# Patient Record
Sex: FEMALE | Race: WHITE | NOT HISPANIC OR LATINO | Employment: OTHER | URBAN - METROPOLITAN AREA
[De-identification: names, ages, dates, MRNs, and addresses within clinical notes are randomized per-mention and may not be internally consistent; named-entity substitution may affect disease eponyms.]

---

## 2018-11-07 ENCOUNTER — APPOINTMENT (OUTPATIENT)
Dept: GENERAL RADIOLOGY | Facility: HOSPITAL | Age: 83
End: 2018-11-07

## 2018-11-07 ENCOUNTER — APPOINTMENT (OUTPATIENT)
Dept: CARDIOLOGY | Facility: HOSPITAL | Age: 83
End: 2018-11-07
Attending: INTERNAL MEDICINE

## 2018-11-07 ENCOUNTER — HOSPITAL ENCOUNTER (INPATIENT)
Facility: HOSPITAL | Age: 83
LOS: 6 days | Discharge: HOME | End: 2018-11-13
Attending: EMERGENCY MEDICINE | Admitting: INTERNAL MEDICINE

## 2018-11-07 DIAGNOSIS — Z74.09 IMPAIRED FUNCTIONAL MOBILITY, BALANCE, GAIT, AND ENDURANCE: ICD-10-CM

## 2018-11-07 DIAGNOSIS — J18.9 COMMUNITY ACQUIRED PNEUMONIA OF RIGHT LOWER LOBE OF LUNG: Primary | ICD-10-CM

## 2018-11-07 DIAGNOSIS — J96.01 ACUTE RESPIRATORY FAILURE WITH HYPOXIA (HCC): ICD-10-CM

## 2018-11-07 DIAGNOSIS — R13.12 OROPHARYNGEAL DYSPHAGIA: ICD-10-CM

## 2018-11-07 DIAGNOSIS — R09.02 HYPOXEMIA: ICD-10-CM

## 2018-11-07 DIAGNOSIS — Z78.9 IMPAIRED MOBILITY AND ADLS: ICD-10-CM

## 2018-11-07 DIAGNOSIS — Z74.09 IMPAIRED MOBILITY AND ADLS: ICD-10-CM

## 2018-11-07 PROBLEM — I95.9 HYPOTENSION: Status: ACTIVE | Noted: 2018-11-07

## 2018-11-07 PROBLEM — R77.8 ELEVATED TROPONIN: Status: ACTIVE | Noted: 2018-11-07

## 2018-11-07 PROBLEM — J69.0 ASPIRATION PNEUMONIA (HCC): Status: ACTIVE | Noted: 2018-11-07
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
## 2018-11-07 PROBLEM — G30.1 LATE ONSET ALZHEIMER'S DISEASE WITHOUT BEHAVIORAL DISTURBANCE (HCC): Status: ACTIVE | Noted: 2018-11-07

## 2018-11-07 PROBLEM — F02.80 LATE ONSET ALZHEIMER'S DISEASE WITHOUT BEHAVIORAL DISTURBANCE (HCC): Status: ACTIVE | Noted: 2018-11-07

## 2018-11-07 PROBLEM — I48.91 ATRIAL FIBRILLATION WITH RAPID VENTRICULAR RESPONSE (HCC): Status: ACTIVE | Noted: 2018-11-07

## 2018-11-07 LAB
ALBUMIN SERPL-MCNC: 4.02 G/DL (ref 3.2–4.8)
ALBUMIN/GLOB SERPL: 1.4 G/DL (ref 1.5–2.5)
ALP SERPL-CCNC: 74 U/L (ref 25–100)
ALT SERPL W P-5'-P-CCNC: 23 U/L (ref 7–40)
ANION GAP SERPL CALCULATED.3IONS-SCNC: 8 MMOL/L (ref 3–11)
APTT PPP: 110.4 SECONDS (ref 55–70)
APTT PPP: 29.9 SECONDS (ref 24–31)
APTT PPP: 93.5 SECONDS (ref 55–70)
ARTERIAL PATENCY WRIST A: ABNORMAL
AST SERPL-CCNC: 34 U/L (ref 0–33)
ATMOSPHERIC PRESS: ABNORMAL MMHG
BASE EXCESS BLDA CALC-SCNC: 0.8 MMOL/L (ref 0–2)
BASOPHILS # BLD AUTO: 0.01 10*3/MM3 (ref 0–0.2)
BASOPHILS NFR BLD AUTO: 0.1 % (ref 0–1)
BDY SITE: ABNORMAL
BH CV ECHO MEAS - AO ROOT AREA (BSA CORRECTED): 1.9
BH CV ECHO MEAS - AO ROOT AREA: 6.2 CM^2
BH CV ECHO MEAS - AO ROOT DIAM: 2.8 CM
BH CV ECHO MEAS - BSA(HAYCOCK): 1.5 M^2
BH CV ECHO MEAS - BSA: 1.5 M^2
BH CV ECHO MEAS - BZI_BMI: 22.5 KILOGRAMS/M^2
BH CV ECHO MEAS - BZI_METRIC_HEIGHT: 152.4 CM
BH CV ECHO MEAS - BZI_METRIC_WEIGHT: 52.2 KG
BH CV ECHO MEAS - EDV(CUBED): 49 ML
BH CV ECHO MEAS - EDV(MOD-SP2): 22 ML
BH CV ECHO MEAS - EDV(MOD-SP4): 32 ML
BH CV ECHO MEAS - EDV(TEICH): 56.6 ML
BH CV ECHO MEAS - EF(CUBED): 68.5 %
BH CV ECHO MEAS - EF(MOD-BP): 63 %
BH CV ECHO MEAS - EF(MOD-SP2): 50 %
BH CV ECHO MEAS - EF(MOD-SP4): 62.5 %
BH CV ECHO MEAS - EF(TEICH): 61 %
BH CV ECHO MEAS - ESV(CUBED): 15.4 ML
BH CV ECHO MEAS - ESV(MOD-SP2): 11 ML
BH CV ECHO MEAS - ESV(MOD-SP4): 12 ML
BH CV ECHO MEAS - ESV(TEICH): 22.1 ML
BH CV ECHO MEAS - FS: 32 %
BH CV ECHO MEAS - IVS/LVPW: 1.1
BH CV ECHO MEAS - IVSD: 1.2 CM
BH CV ECHO MEAS - LA DIMENSION: 2.9 CM
BH CV ECHO MEAS - LA/AO: 1
BH CV ECHO MEAS - LAD MAJOR: 6.3 CM
BH CV ECHO MEAS - LAT PEAK E' VEL: 10.4 CM/SEC
BH CV ECHO MEAS - LATERAL E/E' RATIO: 8.2
BH CV ECHO MEAS - LV DIASTOLIC VOL/BSA (35-75): 21.7 ML/M^2
BH CV ECHO MEAS - LV MASS(C)D: 131.6 GRAMS
BH CV ECHO MEAS - LV MASS(C)DI: 89.2 GRAMS/M^2
BH CV ECHO MEAS - LV SYSTOLIC VOL/BSA (12-30): 8.1 ML/M^2
BH CV ECHO MEAS - LVIDD: 3.7 CM
BH CV ECHO MEAS - LVIDS: 2.3 CM
BH CV ECHO MEAS - LVLD AP2: 5.8 CM
BH CV ECHO MEAS - LVLD AP4: 6.1 CM
BH CV ECHO MEAS - LVLS AP2: 5.6 CM
BH CV ECHO MEAS - LVLS AP4: 5.4 CM
BH CV ECHO MEAS - LVPWD: 1.2 CM
BH CV ECHO MEAS - MED PEAK E' VEL: 9.1 CM/SEC
BH CV ECHO MEAS - MEDIAL E/E' RATIO: 9.3
BH CV ECHO MEAS - MV DEC SLOPE: 700 CM/SEC^2
BH CV ECHO MEAS - MV DEC TIME: 0.18 SEC
BH CV ECHO MEAS - MV E MAX VEL: 84.9 CM/SEC
BH CV ECHO MEAS - PA ACC SLOPE: 954 CM/SEC^2
BH CV ECHO MEAS - PA ACC TIME: 0.07 SEC
BH CV ECHO MEAS - PA PR(ACCEL): 49.3 MMHG
BH CV ECHO MEAS - RVDD: 2.8 CM
BH CV ECHO MEAS - SI(CUBED): 22.8 ML/M^2
BH CV ECHO MEAS - SI(MOD-SP2): 7.5 ML/M^2
BH CV ECHO MEAS - SI(MOD-SP4): 13.6 ML/M^2
BH CV ECHO MEAS - SI(TEICH): 23.4 ML/M^2
BH CV ECHO MEAS - SV(CUBED): 33.6 ML
BH CV ECHO MEAS - SV(MOD-SP2): 11 ML
BH CV ECHO MEAS - SV(MOD-SP4): 20 ML
BH CV ECHO MEAS - SV(TEICH): 34.5 ML
BH CV ECHO MEAS - TR MAX PG: 15 MMHG
BH CV ECHO MEAS - TR MAX VEL: 194 CM/SEC
BH CV ECHO MEASUREMENTS AVERAGE E/E' RATIO: 8.71
BH CV VAS BP RIGHT ARM: NORMAL MMHG
BILIRUB SERPL-MCNC: 0.6 MG/DL (ref 0.3–1.2)
BNP SERPL-MCNC: 807 PG/ML (ref 0–100)
BODY TEMPERATURE: 37 C
BUN BLD-MCNC: 23 MG/DL (ref 9–23)
BUN/CREAT SERPL: 27.4 (ref 7–25)
CALCIUM SPEC-SCNC: 8.2 MG/DL (ref 8.7–10.4)
CHLORIDE SERPL-SCNC: 99 MMOL/L (ref 99–109)
CO2 SERPL-SCNC: 25 MMOL/L (ref 20–31)
COHGB MFR BLD: 0.8 % (ref 0–2)
CREAT BLD-MCNC: 0.84 MG/DL (ref 0.6–1.3)
D-LACTATE SERPL-SCNC: 1.7 MMOL/L (ref 0.5–2)
DEPRECATED RDW RBC AUTO: 48.5 FL (ref 37–54)
EOSINOPHIL # BLD AUTO: 0 10*3/MM3 (ref 0–0.3)
EOSINOPHIL NFR BLD AUTO: 0 % (ref 0–3)
ERYTHROCYTE [DISTWIDTH] IN BLOOD BY AUTOMATED COUNT: 14.8 % (ref 11.3–14.5)
FLUAV SUBTYP SPEC NAA+PROBE: NORMAL
FLUAV SUBTYP SPEC NAA+PROBE: NOT DETECTED
FLUBV RNA ISLT QL NAA+PROBE: NORMAL
FLUBV RNA ISLT QL NAA+PROBE: NOT DETECTED
GFR SERPL CREATININE-BSD FRML MDRD: 64 ML/MIN/1.73
GLOBULIN UR ELPH-MCNC: 2.9 GM/DL
GLUCOSE BLD-MCNC: 114 MG/DL (ref 70–100)
HCO3 BLDA-SCNC: 26.1 MMOL/L (ref 20–26)
HCT VFR BLD AUTO: 36.8 % (ref 34.5–44)
HCT VFR BLD CALC: 34 %
HGB BLD-MCNC: 11.9 G/DL (ref 11.5–15.5)
HGB BLDA-MCNC: 11.1 G/DL (ref 14–18)
HOROWITZ INDEX BLD+IHG-RTO: 80 %
IMM GRANULOCYTES # BLD: 0.03 10*3/MM3 (ref 0–0.03)
IMM GRANULOCYTES NFR BLD: 0.3 % (ref 0–0.6)
INR PPP: 1.08 (ref 0.91–1.09)
INR PPP: 1.09 (ref 0.91–1.09)
LV EF 2D ECHO EST: 70 %
LYMPHOCYTES # BLD AUTO: 1.1 10*3/MM3 (ref 0.6–4.8)
LYMPHOCYTES NFR BLD AUTO: 12.1 % (ref 24–44)
MAXIMAL PREDICTED HEART RATE: 133 BPM
MCH RBC QN AUTO: 28.7 PG (ref 27–31)
MCHC RBC AUTO-ENTMCNC: 32.3 G/DL (ref 32–36)
MCV RBC AUTO: 88.9 FL (ref 80–99)
METHGB BLD QL: 0.9 % (ref 0–1.5)
MODALITY: ABNORMAL
MONOCYTES # BLD AUTO: 1.68 10*3/MM3 (ref 0–1)
MONOCYTES NFR BLD AUTO: 18.5 % (ref 0–12)
NEUTROPHILS # BLD AUTO: 6.28 10*3/MM3 (ref 1.5–8.3)
NEUTROPHILS NFR BLD AUTO: 69.3 % (ref 41–71)
NOTE: ABNORMAL
OXYHGB MFR BLDV: 97.1 % (ref 94–99)
PCO2 BLDA: 43.6 MM HG (ref 35–45)
PCO2 TEMP ADJ BLD: 43.6 MM HG (ref 35–45)
PH BLDA: 7.39 PH UNITS (ref 7.35–7.45)
PH, TEMP CORRECTED: 7.39 PH UNITS
PLATELET # BLD AUTO: 154 10*3/MM3 (ref 150–450)
PMV BLD AUTO: 10.4 FL (ref 6–12)
PO2 BLDA: 120 MM HG (ref 83–108)
PO2 TEMP ADJ BLD: 120 MM HG (ref 83–108)
POTASSIUM BLD-SCNC: 4 MMOL/L (ref 3.5–5.5)
PROCALCITONIN SERPL-MCNC: 2.48 NG/ML
PROT SERPL-MCNC: 6.9 G/DL (ref 5.7–8.2)
PROTHROMBIN TIME: 11.3 SECONDS (ref 9.6–11.5)
PROTHROMBIN TIME: 11.4 SECONDS (ref 9.6–11.5)
RBC # BLD AUTO: 4.14 10*6/MM3 (ref 3.89–5.14)
SODIUM BLD-SCNC: 132 MMOL/L (ref 132–146)
STRESS TARGET HR: 113 BPM
TROPONIN I SERPL-MCNC: 0.2 NG/ML
WBC NRBC COR # BLD: 9.07 10*3/MM3 (ref 3.5–10.8)

## 2018-11-07 PROCEDURE — 83880 ASSAY OF NATRIURETIC PEPTIDE: CPT | Performed by: EMERGENCY MEDICINE

## 2018-11-07 PROCEDURE — 85730 THROMBOPLASTIN TIME PARTIAL: CPT

## 2018-11-07 PROCEDURE — 25010000002 CEFTRIAXONE PER 250 MG: Performed by: EMERGENCY MEDICINE

## 2018-11-07 PROCEDURE — 87502 INFLUENZA DNA AMP PROBE: CPT | Performed by: EMERGENCY MEDICINE

## 2018-11-07 PROCEDURE — 85730 THROMBOPLASTIN TIME PARTIAL: CPT | Performed by: INTERNAL MEDICINE

## 2018-11-07 PROCEDURE — 93306 TTE W/DOPPLER COMPLETE: CPT | Performed by: INTERNAL MEDICINE

## 2018-11-07 PROCEDURE — 94799 UNLISTED PULMONARY SVC/PX: CPT

## 2018-11-07 PROCEDURE — 71045 X-RAY EXAM CHEST 1 VIEW: CPT

## 2018-11-07 PROCEDURE — 36415 COLL VENOUS BLD VENIPUNCTURE: CPT

## 2018-11-07 PROCEDURE — 94760 N-INVAS EAR/PLS OXIMETRY 1: CPT

## 2018-11-07 PROCEDURE — 87040 BLOOD CULTURE FOR BACTERIA: CPT | Performed by: EMERGENCY MEDICINE

## 2018-11-07 PROCEDURE — 85730 THROMBOPLASTIN TIME PARTIAL: CPT | Performed by: EMERGENCY MEDICINE

## 2018-11-07 PROCEDURE — 84484 ASSAY OF TROPONIN QUANT: CPT | Performed by: EMERGENCY MEDICINE

## 2018-11-07 PROCEDURE — 94640 AIRWAY INHALATION TREATMENT: CPT

## 2018-11-07 PROCEDURE — 25010000002 AZITHROMYCIN: Performed by: EMERGENCY MEDICINE

## 2018-11-07 PROCEDURE — 93005 ELECTROCARDIOGRAM TRACING: CPT | Performed by: EMERGENCY MEDICINE

## 2018-11-07 PROCEDURE — 85610 PROTHROMBIN TIME: CPT | Performed by: EMERGENCY MEDICINE

## 2018-11-07 PROCEDURE — 83605 ASSAY OF LACTIC ACID: CPT | Performed by: EMERGENCY MEDICINE

## 2018-11-07 PROCEDURE — 36600 WITHDRAWAL OF ARTERIAL BLOOD: CPT

## 2018-11-07 PROCEDURE — 80053 COMPREHEN METABOLIC PANEL: CPT | Performed by: EMERGENCY MEDICINE

## 2018-11-07 PROCEDURE — 87040 BLOOD CULTURE FOR BACTERIA: CPT | Performed by: INTERNAL MEDICINE

## 2018-11-07 PROCEDURE — 84145 PROCALCITONIN (PCT): CPT | Performed by: EMERGENCY MEDICINE

## 2018-11-07 PROCEDURE — 99223 1ST HOSP IP/OBS HIGH 75: CPT | Performed by: INTERNAL MEDICINE

## 2018-11-07 PROCEDURE — 25010000002 HEPARIN (PORCINE) PER 1000 UNITS: Performed by: INTERNAL MEDICINE

## 2018-11-07 PROCEDURE — 85610 PROTHROMBIN TIME: CPT | Performed by: INTERNAL MEDICINE

## 2018-11-07 PROCEDURE — 99291 CRITICAL CARE FIRST HOUR: CPT | Performed by: INTERNAL MEDICINE

## 2018-11-07 PROCEDURE — 93306 TTE W/DOPPLER COMPLETE: CPT

## 2018-11-07 PROCEDURE — 82805 BLOOD GASES W/O2 SATURATION: CPT

## 2018-11-07 PROCEDURE — 99285 EMERGENCY DEPT VISIT HI MDM: CPT

## 2018-11-07 PROCEDURE — 85025 COMPLETE CBC W/AUTO DIFF WBC: CPT | Performed by: EMERGENCY MEDICINE

## 2018-11-07 PROCEDURE — 25010000002 LORAZEPAM PER 2 MG: Performed by: NURSE PRACTITIONER

## 2018-11-07 RX ORDER — HEPARIN SODIUM 1000 [USP'U]/ML
60 INJECTION, SOLUTION INTRAVENOUS; SUBCUTANEOUS ONCE
Status: COMPLETED | OUTPATIENT
Start: 2018-11-07 | End: 2018-11-07

## 2018-11-07 RX ORDER — MELATONIN
1000 DAILY
Status: DISCONTINUED | OUTPATIENT
Start: 2018-11-08 | End: 2018-11-13 | Stop reason: HOSPADM

## 2018-11-07 RX ORDER — LEVOTHYROXINE SODIUM 88 UG/1
88 TABLET ORAL SEE ADMIN INSTRUCTIONS
COMMUNITY

## 2018-11-07 RX ORDER — DIAZEPAM 2 MG/1
2 TABLET ORAL 3 TIMES DAILY PRN
Status: ON HOLD | COMMUNITY
End: 2018-11-13 | Stop reason: SDUPTHER

## 2018-11-07 RX ORDER — ASPIRIN 81 MG/1
81 TABLET ORAL DAILY
COMMUNITY

## 2018-11-07 RX ORDER — LEVOTHYROXINE SODIUM 88 UG/1
88 TABLET ORAL
Status: DISCONTINUED | OUTPATIENT
Start: 2018-11-08 | End: 2018-11-13 | Stop reason: HOSPADM

## 2018-11-07 RX ORDER — ONDANSETRON 2 MG/ML
4 INJECTION INTRAMUSCULAR; INTRAVENOUS EVERY 6 HOURS PRN
Status: DISCONTINUED | OUTPATIENT
Start: 2018-11-07 | End: 2018-11-13 | Stop reason: HOSPADM

## 2018-11-07 RX ORDER — ALBUTEROL SULFATE 2.5 MG/3ML
2.5 SOLUTION RESPIRATORY (INHALATION) EVERY 4 HOURS PRN
Status: DISCONTINUED | OUTPATIENT
Start: 2018-11-07 | End: 2018-11-13 | Stop reason: HOSPADM

## 2018-11-07 RX ORDER — CEFTRIAXONE SODIUM 1 G/50ML
1 INJECTION, SOLUTION INTRAVENOUS EVERY 24 HOURS
Status: DISCONTINUED | OUTPATIENT
Start: 2018-11-08 | End: 2018-11-08 | Stop reason: ALTCHOICE

## 2018-11-07 RX ORDER — MELATONIN
1000 DAILY
COMMUNITY

## 2018-11-07 RX ORDER — SODIUM CHLORIDE 0.9 % (FLUSH) 0.9 %
3-10 SYRINGE (ML) INJECTION AS NEEDED
Status: DISCONTINUED | OUTPATIENT
Start: 2018-11-07 | End: 2018-11-13 | Stop reason: HOSPADM

## 2018-11-07 RX ORDER — DONEPEZIL HYDROCHLORIDE 10 MG/1
10 TABLET, FILM COATED ORAL NIGHTLY
Status: DISCONTINUED | OUTPATIENT
Start: 2018-11-07 | End: 2018-11-13 | Stop reason: HOSPADM

## 2018-11-07 RX ORDER — LORAZEPAM 2 MG/ML
0.25 INJECTION INTRAMUSCULAR ONCE
Status: COMPLETED | OUTPATIENT
Start: 2018-11-07 | End: 2018-11-07

## 2018-11-07 RX ORDER — ACETAMINOPHEN 325 MG/1
650 TABLET ORAL EVERY 4 HOURS PRN
Status: DISCONTINUED | OUTPATIENT
Start: 2018-11-07 | End: 2018-11-13 | Stop reason: HOSPADM

## 2018-11-07 RX ORDER — GLYCOPYRROLATE 0.2 MG/ML
0.2 INJECTION INTRAMUSCULAR; INTRAVENOUS EVERY 4 HOURS PRN
Status: DISCONTINUED | OUTPATIENT
Start: 2018-11-07 | End: 2018-11-13 | Stop reason: HOSPADM

## 2018-11-07 RX ORDER — ATORVASTATIN CALCIUM 10 MG/1
10 TABLET, FILM COATED ORAL NIGHTLY
Status: DISCONTINUED | OUTPATIENT
Start: 2018-11-07 | End: 2018-11-13 | Stop reason: HOSPADM

## 2018-11-07 RX ORDER — ALENDRONATE SODIUM 70 MG/1
70 TABLET ORAL
COMMUNITY

## 2018-11-07 RX ORDER — DONEPEZIL HYDROCHLORIDE 10 MG/1
10 TABLET, FILM COATED ORAL NIGHTLY
COMMUNITY

## 2018-11-07 RX ORDER — MULTIPLE VITAMINS W/ MINERALS TAB 9MG-400MCG
1 TAB ORAL DAILY
Status: DISCONTINUED | OUTPATIENT
Start: 2018-11-08 | End: 2018-11-13 | Stop reason: HOSPADM

## 2018-11-07 RX ORDER — HEPARIN SODIUM 1000 [USP'U]/ML
60 INJECTION, SOLUTION INTRAVENOUS; SUBCUTANEOUS AS NEEDED
Status: DISCONTINUED | OUTPATIENT
Start: 2018-11-07 | End: 2018-11-07

## 2018-11-07 RX ORDER — ATORVASTATIN CALCIUM 10 MG/1
10 TABLET, FILM COATED ORAL NIGHTLY
COMMUNITY

## 2018-11-07 RX ORDER — DOCUSATE SODIUM 100 MG/1
100 CAPSULE, LIQUID FILLED ORAL 2 TIMES DAILY PRN
Status: DISCONTINUED | OUTPATIENT
Start: 2018-11-07 | End: 2018-11-13 | Stop reason: HOSPADM

## 2018-11-07 RX ORDER — SODIUM CHLORIDE 0.9 % (FLUSH) 0.9 %
3 SYRINGE (ML) INJECTION EVERY 12 HOURS SCHEDULED
Status: DISCONTINUED | OUTPATIENT
Start: 2018-11-07 | End: 2018-11-13 | Stop reason: HOSPADM

## 2018-11-07 RX ORDER — CEFTRIAXONE SODIUM 1 G/50ML
1 INJECTION, SOLUTION INTRAVENOUS ONCE
Status: COMPLETED | OUTPATIENT
Start: 2018-11-07 | End: 2018-11-07

## 2018-11-07 RX ORDER — DIAZEPAM 2 MG/1
2 TABLET ORAL 3 TIMES DAILY PRN
Status: DISCONTINUED | OUTPATIENT
Start: 2018-11-07 | End: 2018-11-13 | Stop reason: HOSPADM

## 2018-11-07 RX ORDER — HEPARIN SODIUM 1000 [USP'U]/ML
30 INJECTION, SOLUTION INTRAVENOUS; SUBCUTANEOUS AS NEEDED
Status: DISCONTINUED | OUTPATIENT
Start: 2018-11-07 | End: 2018-11-07

## 2018-11-07 RX ORDER — ASPIRIN 81 MG/1
81 TABLET ORAL DAILY
Status: DISCONTINUED | OUTPATIENT
Start: 2018-11-08 | End: 2018-11-13 | Stop reason: HOSPADM

## 2018-11-07 RX ADMIN — ALBUTEROL SULFATE 2.5 MG: 2.5 SOLUTION RESPIRATORY (INHALATION) at 19:09

## 2018-11-07 RX ADMIN — LORAZEPAM 0.25 MG: 2 INJECTION INTRAMUSCULAR; INTRAVENOUS at 19:44

## 2018-11-07 RX ADMIN — IPRATROPIUM BROMIDE 0.5 MG: 0.5 SOLUTION RESPIRATORY (INHALATION) at 12:43

## 2018-11-07 RX ADMIN — HEPARIN SODIUM 12 UNITS/KG/HR: 10000 INJECTION, SOLUTION INTRAVENOUS at 16:58

## 2018-11-07 RX ADMIN — HEPARIN SODIUM 3130 UNITS: 1000 INJECTION, SOLUTION INTRAVENOUS; SUBCUTANEOUS at 17:01

## 2018-11-07 RX ADMIN — AZITHROMYCIN MONOHYDRATE 500 MG: 500 INJECTION, POWDER, LYOPHILIZED, FOR SOLUTION INTRAVENOUS at 13:27

## 2018-11-07 RX ADMIN — CEFTRIAXONE SODIUM 1 G: 1 INJECTION, SOLUTION INTRAVENOUS at 12:40

## 2018-11-07 NOTE — ED PROVIDER NOTES
Subjective   Katharine Mendoza is a 87 y.o.female who presents to the ED status post fall. The patient experienced a fall in her nursing home today. She states she was sitting in her bed when she lost her balance after standing up. She denies hitting her head or losing consciousness during the fall. She denies any pain from the fall. The nursing home decided she needed to be evaluated after they discovered she was pale, confused, and had a fast heart rate. When they discovered her PCP was not available, they sent her here to be evaluated. She has not taken any medication since the fall this morning. She also complains of a cough, but denies any shortness of breath, chest pain, vomiting, or diarrhea. Additionally, she denies any history of COPD. There are no other complaints at this time.         History provided by:  Patient and relative  Fall   Mechanism of injury: fall    Injury location: no injuries.  Incident location:  shelter  Time since incident: just PTA.  Arrived directly from scene: yes    Fall:     Fall occurred:  Standing    Impact surface:  Hard floor    Point of impact:  Unable to specify    Entrapped after fall: no    Suspicion of alcohol use: no    Suspicion of drug use: no    Prior to arrival data:     Patient ambulatory at scene: yes      Blood loss:  None    Responsiveness at scene:  Alert    Orientation at scene:  Person, place, situation and time    Loss of consciousness: no      Amnesic to event: no    Associated symptoms: no abdominal pain, no back pain, no chest pain, no headaches, no loss of consciousness, no neck pain and no vomiting    Risk factors: no COPD        Review of Systems   Respiratory: Positive for cough.    Cardiovascular: Positive for palpitations (fast heart rate). Negative for chest pain.   Gastrointestinal: Negative for abdominal pain, diarrhea and vomiting.   Musculoskeletal: Negative for back pain and neck pain.   Skin: Positive for pallor.   Neurological: Negative  for loss of consciousness and headaches.   Psychiatric/Behavioral: Positive for confusion.   All other systems reviewed and are negative.      Past Medical History:   Diagnosis Date   • Anxiety    • Dementia    • Disease of thyroid gland    • Hyperlipidemia    • Osteoporosis        No Known Allergies    History reviewed. No pertinent surgical history.    History reviewed. No pertinent family history.    Social History     Social History   • Marital status:      Social History Main Topics   • Smoking status: Never Smoker   • Smokeless tobacco: Never Used   • Alcohol use No   • Drug use: No     Other Topics Concern   • Not on file     Social History Narrative    Lives in assisted living.           Objective   Physical Exam   Constitutional: She is oriented to person, place, and time. She appears well-developed and well-nourished. No distress.   HENT:   Head: Normocephalic and atraumatic.   Nose: Nose normal.   Eyes: Conjunctivae are normal. No scleral icterus.   Neck: Normal range of motion. Neck supple.   Cardiovascular: Normal heart sounds.  A regularly irregular rhythm present. Tachycardia present.    No murmur heard.  Pulmonary/Chest: Effort normal. No respiratory distress. She has wheezes.   Mild left lung wheezing.    Abdominal: Soft. Bowel sounds are normal. There is no tenderness.   Musculoskeletal: Normal range of motion. She exhibits no edema.   Neurological: She is alert and oriented to person, place, and time.   Skin: Skin is warm and dry.   Psychiatric: She has a normal mood and affect. Her behavior is normal.   Nursing note and vitals reviewed.      Procedures         ED Course     Hypoxemia improved with supplemental oxygen.  EKG AF.  CXR shows RLL infiltrate.  IV abx given.  Patient stable on serial rechecks.  Discussed exam findings, test results so far and concerns in detail at the bedside.  Discussed need for admission for further evaluation and treatment.                  MDM  Number  of Diagnoses or Management Options  Community acquired pneumonia of right lower lobe of lung (CMS/HCC):   Hypoxemia:      Amount and/or Complexity of Data Reviewed  Clinical lab tests: ordered and reviewed  Tests in the radiology section of CPT®: ordered and reviewed  Discuss the patient with other providers: yes  Independent visualization of images, tracings, or specimens: yes        Final diagnoses:   Community acquired pneumonia of right lower lobe of lung (CMS/HCC)   Hypoxemia       Documentation assistance provided by hue Menjivar.  Information recorded by the hue was done at my direction and has been verified and validated by me.     Eloy Menjivar  11/07/18 1243       Eloy Menjivar  11/07/18 1341       Damion Johns MD  11/07/18 3257

## 2018-11-07 NOTE — H&P
[facility] Medicine Services  HISTORY AND PHYSICAL    Patient Name: Katharine Mendoza  : 1931  MRN: [id number]  Primary Care Physician: Samia Carrington MD  Date of admission: 2018      Subjective   Subjective     Chief Complaint:  Fall at nursing home, cough, weakness    HPI:  Katharine Mendoza is a 87 y.o. female  who lives in assisted living facility per family.  Family was provided by patient who is a poor historian with dementia and by her son who is at the bedside.  Patient presented with a fall at her assisted living today, she had no loss of consciousness, no focal neurologic reported symptoms, no pain from her fall, she denies injury, she denies head trauma.  Patient's son felt she was pale, stable chronic confusion, reportedly she was found to have tachycardia and cough today prior to being sent to the hospital.  She currently denies any complaints including shortness of breath or palpitation.  She was found to have pneumonia and hypoxic in atrial fibrillation in the emergency department and is being admitted for further medical management.    Review of Systems   Constitutional: Negative for fever.   HENT: Negative.    Eyes: Negative for blurred vision and visual disturbance.   Respiratory: Positive for cough and shortness of breath.    Cardiovascular: Negative for chest pain and palpitations.   Gastrointestinal: Negative.    Endocrine: Negative for cold intolerance and heat intolerance.   Genitourinary: Negative.    Musculoskeletal: Positive for falls. Negative for muscle cramps.   Skin: Negative for rash.   Neurological: Positive for weakness.   Psychiatric/Behavioral: Negative.       Please ignore below review of systems due to redundant computer issue  Review of Systems   Constitution: Positive for weakness. Negative for fever.   HENT: Negative.    Eyes: Negative for blurred vision and visual disturbance.   Cardiovascular: Negative for chest pain and  palpitations.   Respiratory: Positive for cough and shortness of breath.    Endocrine: Negative for cold intolerance and heat intolerance.   Skin: Negative for rash.   Musculoskeletal: Positive for falls. Negative for muscle cramps.   Gastrointestinal: Negative.    Genitourinary: Negative.    Psychiatric/Behavioral: Negative.          Personal History     Past Medical History:   Diagnosis Date   • Anxiety    • Dementia    • Disease of thyroid gland    • Hyperlipidemia    • Osteoporosis        History reviewed. No pertinent surgical history.    Family History: family history is not on file.     Social History:    Social History     Social History Narrative    Lives in assisted living.         Medications:  Available home medication information reviewed     No Known Allergies    Objective   Objective     Vital Signs:   Temp:  [99.3 °F (37.4 °C)] 99.3 °F (37.4 °C)  Heart Rate:  [] 93  Resp:  [18] 18  BP: ()/(48-65) 101/48        Physical Exam   Constitutional: No acute distress, awake, alert, elderly  Eyes: PERRLA, sclerae anicteric, no conjunctival injection  HENT: NCAT, mucous membranes moist  Neck: Supple, no thyromegaly, no lymphadenopathy, trachea midline, no masses  Respiratory: Clear to auscultation bilaterally, mild tachypnea, current respiratory rate is approximately 20, breathing is mildly labored on nasal cannula oxygen.  Cough is present.  Cardiovascular: RRR, no murmurs, rubs, or gallops, palpable pedal pulses bilaterally, trace bilateral lower extremity edema  Gastrointestinal: Positive bowel sounds, soft, nontender, nondistended  Musculoskeletal: Elderly with normal musculature for age, some muscle wasting noted, no clubbing or cyanosis to extremities  Psychiatric: Appropriate affect, cooperative, conversational and pleasant  Neurologic: Patient is oriented to person and place but not time.  She has poor short-term memory.  No slurred speech or facial droop.    Skin: No rashes or  jaundice, skin is warm      Results Reviewed:  I have personally reviewed current lab, radiology, and data and agree.      Results from last 7 days  Lab Units 11/07/18  1232   WBC 10*3/mm3 9.07   HEMOGLOBIN g/dL 11.9   HEMATOCRIT % 36.8   PLATELETS 10*3/mm3 154   INR  1.08       Results from last 7 days  Lab Units 11/07/18  1232   SODIUM mmol/L 132   POTASSIUM mmol/L 4.0   CHLORIDE mmol/L 99   CO2 mmol/L 25.0   BUN mg/dL 23   CREATININE mg/dL 0.84   GLUCOSE mg/dL 114*   CALCIUM mg/dL 8.2*   ALT (SGPT) U/L 23   AST (SGOT) U/L 34*   TROPONIN I ng/mL 0.203*     Estimated Creatinine Clearance: 38.9 mL/min (by C-G formula based on SCr of 0.84 mg/dL).  Brief Urine Lab Results     None        BNP   Date Value Ref Range Status   11/07/2018 807.0 (H) 0.0 - 100.0 pg/mL Final     Comment:     Results may be falsely decreased if patient taking Biotin.     Imaging Results (last 24 hours)     Procedure Component Value Units Date/Time    XR Chest 1 View [[id number]] Collected:  11/07/18 1408     Updated:  11/07/18 1431    Narrative:       EXAMINATION: XR CHEST 1 VW- 11/07/2018     INDICATION: cough, hypoxemia, L lung wheezes     TECHNIQUE:  Single view frontal chest.     COMPARISONS: 08/17/2010     FINDINGS:  Calcified granulomata are seen in the left lung. There is  opacity in the periphery of the right lower lobe. No sizable effusion.  No pneumothorax. Atherosclerosis and tortuosity of the aorta. Prominent  cardiopericardial silhouette.       Impression:       Right lower lobe airspace disease which could represent  pneumonia.     D:  11/07/2018  E:  11/07/2018     This report was finalized on 11/7/2018 2:28 PM by Darrel Ribeiro.                Assessment/Plan   Assessment / Plan     Active Hospital Problems    Diagnosis   • **Possible Aspiration pneumonia (CMS/HCC), right lower lobe   • Accident due to mechanical fall without injury   • Hypotension   • New onset Atrial fibrillation with rapid ventricular response (CMS/HCC)   •  Elevated troponin   • Acute respiratory failure with hypoxia (CMS/HCC)   • Late onset Alzheimer's disease without behavioral disturbance   • Community acquired pneumonia of right lower lobe of lung (CMS/HCC)         Assessment & Plan:    87-year-old female who presents to the hospital after a fall at her nursing home and was found to have rapid atrial fibrillation, right lower lobe pneumonia, hypotension, elevated pro-calcitonin, elevated troponin, hypoxia and is being admitted for further medical management.    Possible aspiration pneumonia, right lower lobe:  Patient responding well to antibiotics with azithromycin and ceftriaxone.  Plan to continue.  Speech therapy has been asked to evaluate as well.  Monitor for signs of clinical worsening.  Plan for supportive care and symptom treatment.    Mechanical fall without injury:  Patient reports falling from a sitting position at assisted living.  She denies any pain or injury.  She denies any head trauma.    Hypotension: Likely due to pneumonia.  Blood pressure has improved since earlier pressure in the 90s.  Plan to monitor for now as patient does not appear hypovolemic and has elevated BNP.  Plan for small boluses if needed if further low blood pressures occur.    New-onset atrial fibrillation with rapid ventricular response:   Rapid rate has now normalized and patient is ranging from 70s to 90s.  With low blood pressure plan to hold off on beta blocker for now.  Plan to start heparin drip for atrial fibrillation and elevated troponin and have cardiology evaluate.  Patient will need discussion regarding risks versus benefits of long-term anticoagulation.  She may be a good candidate for low-dose Eliquis.    Elevated troponin: Likely due to supply and demand mismatch as only minimally elevated.  Plan to trend troponin.  Currently on aspirin and heparin.    Acute hypoxic respiratory failure:  Patient with significant hypoxia and saturation in the low 80s at  admission.  She has normalized with nasal cannula oxygen.  Continue oxygen and titrate as needed.    Dementia: Continue home medications.  Currently patient is not showing agitation but monitor.  Scheduled melatonin to help with sleep.  If agitation occurs I discussed with son who is okay with low dose Seroquel as needed.  I discussed that this is off label use and I discussed black box warnings.    Chronic anxiety and chronic benzodiazepine use: Patient chronically takes Valium.  She would likely help withdrawal if held.  Plan to continue for now.              DVT prophylaxis: Heparin drip    CODE STATUS:    Code Status and Medical Interventions:   Ordered at: 11/07/18 1622     Code Status:    CPR     Medical Interventions (Level of Support Prior to Arrest):    Full     Comments:    Discussed with patient's son who wanted full code currently but plans to review her living will and bring it to the hospital       Admission Status:  I believe this patient meets inpatient status and will require hospitalization for greater than 2 days due to pneumonia and atrial fibrillation.      Electronically signed by Nayan Alvarado MD, 11/07/18, 4:24 PM.

## 2018-11-07 NOTE — PLAN OF CARE
Problem: Patient Care Overview  Goal: Plan of Care Review  Outcome: Ongoing (interventions implemented as appropriate)   11/07/18 5999   Coping/Psychosocial   Plan of Care Reviewed With patient;family   Plan of Care Review   Progress no change   OTHER   Outcome Summary pt admitted with asp. pna, falls, currently on 5L, no home oxygen.        Problem: Fall Risk (Adult)  Goal: Identify Related Risk Factors and Signs and Symptoms  Outcome: Outcome(s) achieved Date Met: 11/07/18    Goal: Absence of Fall  Outcome: Ongoing (interventions implemented as appropriate)      Problem: Skin Injury Risk (Adult)  Goal: Identify Related Risk Factors and Signs and Symptoms  Outcome: Outcome(s) achieved Date Met: 11/07/18    Goal: Skin Health and Integrity  Outcome: Ongoing (interventions implemented as appropriate)      Problem: Pneumonia (Adult)  Goal: Signs and Symptoms of Listed Potential Problems Will be Absent, Minimized or Managed (Pneumonia)  Outcome: Ongoing (interventions implemented as appropriate)

## 2018-11-08 ENCOUNTER — APPOINTMENT (OUTPATIENT)
Dept: GENERAL RADIOLOGY | Facility: HOSPITAL | Age: 83
End: 2018-11-08

## 2018-11-08 LAB
ANION GAP SERPL CALCULATED.3IONS-SCNC: 8 MMOL/L (ref 3–11)
APTT PPP: 40.5 SECONDS (ref 55–70)
ARTICHOKE IGE QN: 39 MG/DL (ref 0–130)
BUN BLD-MCNC: 24 MG/DL (ref 9–23)
BUN/CREAT SERPL: 30.8 (ref 7–25)
CALCIUM SPEC-SCNC: 7.6 MG/DL (ref 8.7–10.4)
CHLORIDE SERPL-SCNC: 103 MMOL/L (ref 99–109)
CHOLEST SERPL-MCNC: 83 MG/DL (ref 0–200)
CO2 SERPL-SCNC: 25 MMOL/L (ref 20–31)
CREAT BLD-MCNC: 0.78 MG/DL (ref 0.6–1.3)
DEPRECATED RDW RBC AUTO: 48.6 FL (ref 37–54)
ERYTHROCYTE [DISTWIDTH] IN BLOOD BY AUTOMATED COUNT: 14.9 % (ref 11.3–14.5)
GFR SERPL CREATININE-BSD FRML MDRD: 70 ML/MIN/1.73
GLUCOSE BLD-MCNC: 97 MG/DL (ref 70–100)
HBA1C MFR BLD: 5.8 % (ref 4.8–5.6)
HCT VFR BLD AUTO: 33.1 % (ref 34.5–44)
HDLC SERPL-MCNC: 36 MG/DL (ref 40–60)
HGB BLD-MCNC: 10.6 G/DL (ref 11.5–15.5)
INR PPP: 1.09 (ref 0.91–1.09)
MAGNESIUM SERPL-MCNC: 2 MG/DL (ref 1.3–2.7)
MCH RBC QN AUTO: 28.6 PG (ref 27–31)
MCHC RBC AUTO-ENTMCNC: 32 G/DL (ref 32–36)
MCV RBC AUTO: 89.5 FL (ref 80–99)
PLATELET # BLD AUTO: 145 10*3/MM3 (ref 150–450)
PMV BLD AUTO: 10.9 FL (ref 6–12)
POTASSIUM BLD-SCNC: 3.8 MMOL/L (ref 3.5–5.5)
PROTHROMBIN TIME: 11.4 SECONDS (ref 9.6–11.5)
RBC # BLD AUTO: 3.7 10*6/MM3 (ref 3.89–5.14)
SODIUM BLD-SCNC: 136 MMOL/L (ref 132–146)
TRIGL SERPL-MCNC: 47 MG/DL (ref 0–150)
WBC NRBC COR # BLD: 6.14 10*3/MM3 (ref 3.5–10.8)

## 2018-11-08 PROCEDURE — 83036 HEMOGLOBIN GLYCOSYLATED A1C: CPT | Performed by: INTERNAL MEDICINE

## 2018-11-08 PROCEDURE — 25010000002 AZITHROMYCIN PER 500 MG: Performed by: INTERNAL MEDICINE

## 2018-11-08 PROCEDURE — 80061 LIPID PANEL: CPT | Performed by: INTERNAL MEDICINE

## 2018-11-08 PROCEDURE — 94799 UNLISTED PULMONARY SVC/PX: CPT

## 2018-11-08 PROCEDURE — 83735 ASSAY OF MAGNESIUM: CPT | Performed by: INTERNAL MEDICINE

## 2018-11-08 PROCEDURE — 25010000002 HEPARIN (PORCINE) PER 1000 UNITS: Performed by: HOSPITALIST

## 2018-11-08 PROCEDURE — 80048 BASIC METABOLIC PNL TOTAL CA: CPT | Performed by: INTERNAL MEDICINE

## 2018-11-08 PROCEDURE — 74230 X-RAY XM SWLNG FUNCJ C+: CPT

## 2018-11-08 PROCEDURE — 92611 MOTION FLUOROSCOPY/SWALLOW: CPT

## 2018-11-08 PROCEDURE — 92610 EVALUATE SWALLOWING FUNCTION: CPT

## 2018-11-08 PROCEDURE — 85610 PROTHROMBIN TIME: CPT | Performed by: INTERNAL MEDICINE

## 2018-11-08 PROCEDURE — 87205 SMEAR GRAM STAIN: CPT | Performed by: INTERNAL MEDICINE

## 2018-11-08 PROCEDURE — 85027 COMPLETE CBC AUTOMATED: CPT | Performed by: INTERNAL MEDICINE

## 2018-11-08 PROCEDURE — 25010000002 PIPERACILLIN SOD-TAZOBACTAM PER 1 G: Performed by: HOSPITALIST

## 2018-11-08 PROCEDURE — 85730 THROMBOPLASTIN TIME PARTIAL: CPT

## 2018-11-08 PROCEDURE — 99233 SBSQ HOSP IP/OBS HIGH 50: CPT | Performed by: HOSPITALIST

## 2018-11-08 PROCEDURE — 93010 ELECTROCARDIOGRAM REPORT: CPT | Performed by: INTERNAL MEDICINE

## 2018-11-08 PROCEDURE — 99222 1ST HOSP IP/OBS MODERATE 55: CPT | Performed by: INTERNAL MEDICINE

## 2018-11-08 PROCEDURE — 87070 CULTURE OTHR SPECIMN AEROBIC: CPT | Performed by: INTERNAL MEDICINE

## 2018-11-08 PROCEDURE — 93005 ELECTROCARDIOGRAM TRACING: CPT | Performed by: PHYSICIAN ASSISTANT

## 2018-11-08 RX ORDER — HEPARIN SODIUM 5000 [USP'U]/ML
5000 INJECTION, SOLUTION INTRAVENOUS; SUBCUTANEOUS EVERY 8 HOURS SCHEDULED
Status: DISCONTINUED | OUTPATIENT
Start: 2018-11-08 | End: 2018-11-13 | Stop reason: HOSPADM

## 2018-11-08 RX ORDER — SODIUM CHLORIDE 9 MG/ML
25 INJECTION, SOLUTION INTRAVENOUS CONTINUOUS
Status: DISCONTINUED | OUTPATIENT
Start: 2018-11-08 | End: 2018-11-12

## 2018-11-08 RX ADMIN — BARIUM SULFATE 20 ML: 400 PASTE ORAL at 14:10

## 2018-11-08 RX ADMIN — SODIUM CHLORIDE 50 ML/HR: 9 INJECTION, SOLUTION INTRAVENOUS at 11:00

## 2018-11-08 RX ADMIN — BARIUM SULFATE 100 ML: 0.81 POWDER, FOR SUSPENSION ORAL at 14:10

## 2018-11-08 RX ADMIN — ATORVASTATIN CALCIUM 10 MG: 10 TABLET, FILM COATED ORAL at 21:23

## 2018-11-08 RX ADMIN — Medication 2.5 MG: at 21:23

## 2018-11-08 RX ADMIN — HEPARIN SODIUM 5000 UNITS: 5000 INJECTION, SOLUTION INTRAVENOUS; SUBCUTANEOUS at 15:33

## 2018-11-08 RX ADMIN — DONEPEZIL HYDROCHLORIDE 10 MG: 10 TABLET, FILM COATED ORAL at 21:23

## 2018-11-08 RX ADMIN — Medication 3 ML: at 21:23

## 2018-11-08 RX ADMIN — AZITHROMYCIN MONOHYDRATE 250 MG: 500 INJECTION, POWDER, LYOPHILIZED, FOR SOLUTION INTRAVENOUS at 15:33

## 2018-11-08 RX ADMIN — HEPARIN SODIUM 5000 UNITS: 5000 INJECTION, SOLUTION INTRAVENOUS; SUBCUTANEOUS at 21:23

## 2018-11-08 RX ADMIN — TAZOBACTAM SODIUM AND PIPERACILLIN SODIUM 3.38 G: 375; 3 INJECTION, SOLUTION INTRAVENOUS at 17:04

## 2018-11-08 RX ADMIN — BARIUM SULFATE 50 ML: 400 SUSPENSION ORAL at 14:11

## 2018-11-08 RX ADMIN — TAZOBACTAM SODIUM AND PIPERACILLIN SODIUM 3.38 G: 375; 3 INJECTION, SOLUTION INTRAVENOUS at 12:11

## 2018-11-08 RX ADMIN — BARIUM SULFATE 10 ML: 400 SUSPENSION ORAL at 14:11

## 2018-11-08 NOTE — PLAN OF CARE
Problem: Patient Care Overview  Goal: Plan of Care Review  Outcome: Ongoing (interventions implemented as appropriate)   11/08/18 1219   Coping/Psychosocial   Plan of Care Reviewed With patient;family   Plan of Care Review   Progress declining   OTHER   Outcome Summary PATIENT ON NON-REBREATHER, CONFUSED AND UNABLE TO CLEAR SECRETIONS       Problem: Fall Risk (Adult)  Goal: Absence of Fall  Outcome: Ongoing (interventions implemented as appropriate)      Problem: Pneumonia (Adult)  Goal: Signs and Symptoms of Listed Potential Problems Will be Absent, Minimized or Managed (Pneumonia)  Outcome: Ongoing (interventions implemented as appropriate)

## 2018-11-08 NOTE — DISCHARGE INSTR - DIET
MBS/VFSS/FEES    Reason for Referral  Patient was referred for a MBS to assess the efficiency of his/her swallow function, rule out aspiration and make recommendations regarding safe dietary consistencies, effective compensatory strategies, and safe eating environment.                   Recommendations/Treatment  Oral Prep Phase: impaired oral phase of swallowing  Oral Transit Phase: impaired  Oral Residue: impaired              SLP Swallowing Diagnosis: mod-severe, oral dysfunction, pharyngeal dysfunction  Functional Impact: risk of aspiration/pneumonia, risk of malnutrition, risk of dehydration  Rehab Potential/Prognosis, Swallowing: adequate, monitor progress closely  Swallow Criteria for Skilled Therapeutic Interventions Met: demonstrates skilled criteria    Therapy Frequency (Swallow): 5 days per week  Predicted Duration Therapy Intervention (Days): until discharge  SLP Diet Recommendation: puree, honey thick liquids  Recommended Diagnostics: reassess via VFSS (MBS), other (see comments) (when indicated by SLP)  Recommended Precautions and Strategies: upright posture during/after eating, small bites of food and sips of liquid, no straw, multiple swallows per bite of food, multiple swallows per sip of liquid, other (see comments) (general aspiration precautions; oral care BID/PRN)  SLP Rec. for Method of Medication Administration: meds crushed, with pudding or applesauce  Monitor for Signs of Aspiration: yes, notify SLP if any concerns  Anticipated Dischage Disposition: unknown, anticipate therapy at next level of care      Oral Preparation/ Oral Phase  Oral Prep Phase: impaired oral phase of swallowing  Oral Holding: all consistencies tested, secondary to impaired cognitive status  Prolonged Manipulation: all consistencies tested, secondary to reduced lingual strength, secondary to reduced lingual range of motion, secondary to impaired cognitive status  Oral Preparatory Phase, Comment: Prolonged manipulation ?  w/ solids. Oral holding t/o eval.          Pharyngeal Phase  Initiation of Pharyngeal Swallow: bolus in pyriform sinuses  Pharyngeal Phase: impaired pharyngeal phase of swallowing  Penetration Before the Swallow: thin liquids, secondary to reduced back of tongue control, secondary to delayed swallow initiation or mistiming  Penetration During the Swallow: thin liquids, nectar-thick liquids, secondary to delayed swallow initiation or mistiming, secondary to reduced laryngeal elevation, secondary to reduced vestibular closure, other (see comments) (penetration didn't clear and deepened to TVFs)  Aspiration After the Swallow: thin liquids, nectar-thick liquids, secondary to residue, in laryngeal vestibule  Response to Penetration: no response, deep  Response to Aspiration: no response, silent aspiration, could not clear subglottic material  Pharyngeal Residue: all consistencies tested, base of tongue, valleculae, pyriform sinuses, posterior pharyngeal wall, laryngeal vestibule, diffuse within pharynx, secondary to reduced base of tongue retraction, secondary to reduced posterior pharyngeal wall stripping, secondary to reduced laryngeal elevation, other (see comments) (> in valleculae and incr'd w/ solids)  Response to Residue: unable to clear residue, other (see comments) (residue reduced w/ subsequent swallows)  Attempted Compensatory Maneuvers: bolus size, bolus presentation style, alternative liquids/solids, throat clear after swallow, other (see comments) (pt w/ difficulty following commands)  Response to Attempted Compensatory Maneuvers: prevented aspiration, did not reduce residue  Pharyngeal Phase, Comment: Moderate-severe oropharyngeal dysphagia. Penetration occurred before/during the swallow w/ thins and during the swallow w/ nectar-thick liquids 2' mistiming/delay and reduced vestibular closure. Penetration did not clear and deepened w/ continued trials. Pt eventually aspirated thin and nectar-thick  vestibular residue t/o exam. Aspiration was silent. No penetration/aspiration observed w/ honey-thick liquids or pudding. Moderate diffuse pharyngeal residue present w/ all consistencies (> in valleculae and incr'd w/ solids) 2' reduced base of tongue retraction, decr'd pharyngeal stripping wave, and reduced elevation. Residue reduced w/ subsequent swallows, but unable to fully clear.              Cervical Esophageal Phase              Prognosis          MBS/VFSS/FEES 11/12/18 **most recent swallowing recommendations below:    Reason for Referral  Patient was referred for a MBS to assess the efficiency of his/her swallow function, rule out aspiration and make recommendations regarding safe dietary consistencies, effective compensatory strategies, and safe eating environment.        Referring Physician: MD Shelly          Recommendations/Treatment  Oral Prep Phase: impaired oral phase of swallowing  Oral Transit Phase: impaired  Oral Residue: impaired  VFSS Summary: SLP MBS evaluation completed. Pt presents w/ mild-moderate oropharyngeal dysphagia. Prolonged manipulation/mastication and increased A-P transit time w/ regular solid. Mild diffuse oral residue w/ all consistencies tested that typically cleared w/ spontaneous subsequent swallows. Prespill + delay to the level of the pyriform sinuses resulted in aspiration before the swallow w/ thin liquid via straw. Aspiration was eliminated w/ small, single sips of thin liquid via cup. No penetration/aspiration w/ pudding or solid. Mild vallecular residue w/ pudding and solid 2' reduced base of tongue retraction. Recommend soft, whole diet w/ thin liquids via small, single cup sips. Meds whole or crushed in puree. Follow standard aspiration precautions.    Severity Level of Dysphagia: mild-moderate dysphagia  Consistencies Aspirated/Penetrated: aspirated, thin liquids, other (see comments)(only via straw)         SLP Swallowing Diagnosis: mild-moderate, oral  dysfunction, pharyngeal dysfunction  Functional Impact: risk of aspiration/pneumonia  Rehab Potential/Prognosis, Swallowing: adequate, monitor progress closely  Swallow Criteria for Skilled Therapeutic Interventions Met: demonstrates skilled criteria    Therapy Frequency (Swallow): 3 days per week  Predicted Duration Therapy Intervention (Days): until discharge  SLP Diet Recommendation: soft textures, whole, thin liquids, other (see comments)(via small single cup sips. NO straws)  Recommended Precautions and Strategies: upright posture during/after eating, small bites of food and sips of liquid, no straw  SLP Rec. for Method of Medication Administration: meds whole, meds crushed, with pudding or applesauce  Monitor for Signs of Aspiration: yes, notify SLP if any concerns  Anticipated Dischage Disposition: unknown, anticipate therapy at next level of care      Oral Preparation/ Oral Phase  Oral Prep Phase: impaired oral phase of swallowing  Prolonged Manipulation: regular textures         Pharyngeal Phase  Initiation of Pharyngeal Swallow: bolus in pyriform sinuses  Pharyngeal Phase: impaired pharyngeal phase of swallowing  Aspiration Before the Swallow: thin liquids, secondary to reduced back of tongue control, secondary to delayed swallow initiation or mistiming, other (see comments)(only via straw)  Response to Aspiration: throat clear  Pharyngeal Residue: pudding/puree, regular textures, base of tongue, valleculae, secondary to reduced base of tongue retraction  Response to Residue: unable to clear residue  Attempted Compensatory Maneuvers: bolus size, bolus presentation style  Response to Attempted Compensatory Maneuvers: prevented aspiration             Cervical Esophageal Phase              Prognosis

## 2018-11-08 NOTE — PROGRESS NOTES
Discharge Planning Assessment  [facility]     Patient Name: Katharine Mendoza  MRN: [id number]  Today's Date: 11/8/2018    Admit Date: 11/7/2018          Discharge Needs Assessment     Row Name 11/08/18 1440       Living Environment    Provides Primary Care For no one, unable/limited ability to care for self    Family Caregiver if Needed child(bharati), adult    Able to Return to Prior Arrangements yes       Transition Planning    Patient/Family Anticipates Transition to home    Transportation Anticipated family or friend will provide       Discharge Needs Assessment    Equipment Currently Used at Home walker, rolling;shower chair    Row Name 11/08/18 1439       Living Environment    Lives With alone;facility resident    Current Living Arrangements independent/assisted living facility    Primary Care Provided by self            Discharge Plan     Row Name 11/08/18 1448       Plan    Plan [facility]    Patient/Family in Agreement with Plan yes    Plan Comments Spoke with patient and her son, Benson, at bedside. Patient lives in an assisted living apt at [facility] on [facility] [phone], in [facility]. PTA she was independent with ADL' s and used a RW for assist with mobility. Goal is to return to [facility], likely with HH/SN/PT/OT, pending therapy evals. CM following.     Final Discharge Disposition Code 01 - home or self-care        Destination     No service coordination in this encounter.      Durable Medical Equipment     No service coordination in this encounter.      Dialysis/Infusion     No service coordination in this encounter.      Home Medical Care     No service coordination in this encounter.      Social Care     No service coordination in this encounter.        Expected Discharge Date and Time     Expected Discharge Date Expected Discharge Time    Nov 12, 2018               Demographic Summary     Row Name 11/08/18 1439       General Information    Arrived From home    Reason for Consult  discharge planning            Functional Status     Row Name 11/08/18 1439       Functional Status    Usual Activity Tolerance moderate    Current Activity Tolerance fair            Psychosocial    No documentation.           Abuse/Neglect    No documentation.           Legal    No documentation.           Substance Abuse    No documentation.           Patient Forms    No documentation.         Veronica De Leon RN

## 2018-11-08 NOTE — CONSULTS
[facility] - Cardiology Consult    Katharine Mendoza  8/17/1931  S485/1    Admit Date:  11/7/2018      PCP:  Samia Carrington MD  Req MD:  Dr. Nayan Alvarado - Hospitalist  Consulting MD:  Dr. Chester Barreto - Cardiologist    11/08/18    CC:  Fall out of Bed, Weakness, PNA - aspiration vs CAP  Reason for Consult:   Atrial Fibrillation with RVR, elevated troponin    PROBLEM LIST/PMHx:  1. New onset Atrial fibrillation with rapid ventricular response (CMS/HCC)   A.  CHADS2 Vasc = 2.  Fall risk for anti coagulation.     B.  Echo 11/7/18, Dr. Navarro:  EF 70%, mild-mod AR/MR/mild TR.  Moderate MAC present.  2.  Elevated Troponin   A.  Asymptomatic   B.  In setting of fall, weakness, PNA  3.  Possible Aspiration pneumonia (CMS/HCC), right lower lobe  4.  Accident due to mechanical fall without injury  5.  Hypotension, resolved.  6.  Acute respiratory failure with hypoxia (CMS/HCC)  7.  Late onset Alzheimer's disease without behavioral disturbance  8.  Osteopenia  9.  Dyslipidemia  10.  Thyroid disorder, on chronic supplementation      Allergies:  has No Known Allergies.    Prescriptions Prior to Admission   Medication Sig Dispense Refill Last Dose   • alendronate (FOSAMAX) 70 MG tablet Take 70 mg by mouth Every 7 (Seven) Days. Tuesday   10/30/2018   • aspirin 81 MG EC tablet Take 81 mg by mouth Daily.   11/6/2018 at Unknown time   • atorvastatin (LIPITOR) 10 MG tablet Take 10 mg by mouth Every Night.   11/6/2018 at Unknown time   • cholecalciferol (VITAMIN D3) 1000 units tablet Take 1,000 Units by mouth Daily.   11/6/2018 at Unknown time   • diazePAM (VALIUM) 2 MG tablet Take 2 mg by mouth 3 (Three) Times a Day As Needed for Anxiety.   11/5/2018 at Unknown time   • donepezil (ARICEPT) 10 MG tablet Take 10 mg by mouth Every Night.   11/6/2018 at Unknown time   • levothyroxine (SYNTHROID, LEVOTHROID) 88 MCG tablet Take 88 mcg by mouth See Admin Instructions. Take daily EXCEPT on Sunday  "  11/6/2018 at Unknown time   • Multiple Vitamins-Minerals (PRESERVISION AREDS 2 PO) Take 1 capsule by mouth 2 (Two) Times a Day.   11/6/2018 at Unknown time       Current Hospital Scheduled Meds:  aspirin 81 mg Oral Daily   atorvastatin 10 mg Oral Nightly   ceftriaxone 1 g Intravenous Q24H   And      azithromycin 250 mg Intravenous Q24H   cholecalciferol 1,000 Units Oral Daily   donepezil 10 mg Oral Nightly   levothyroxine 88 mcg Oral Once per day on Mon Tue Wed Thu Fri Sat   melatonin 2.5 mg Sublingual Nightly   multivitamin with minerals 1 tablet Oral Daily   sodium chloride 3 mL Intravenous Q12H     Continuous Infusions:  heparin 12 Units/kg/hr Last Rate: 12 Units/kg/hr (11/08/18 0734)   Pharmacy to Dose Heparin       PRN Meds:  •  acetaminophen  •  albuterol  •  diazePAM  •  docusate sodium  •  glycopyrrolate  •  ondansetron  •  Pharmacy to Dose Heparin  •  sodium chloride        CARDIAC RISK FACTORS:   advanced age (older than 55 for men, 65 for women) and dyslipidemia.         HPI:  Katharine Mendoza is an 86 yo CF, resident of [facility].  She lives in an single apartment/room there and has been there for a few years.  She uses a rolling walker for ambulatory assistance and participates in activities and attends all meals.    She was in bed and rolled over at which time she fell out of bed and could not get up.  She did not hit her head or injure herself.  She denied syncope, denied dizziness or lightheadedness.  When help arrived, her HR was noted to be elevated and she appeared more weak than usual.  She was brought via EMS to [facility] ED.  Upon arrival, she was found to be in Atrial Fibrillation with \"tachycardic rate\"(documented 93bpm).  She was hypotensive with LGF 99.3.  She also complained of a cough - CXR indicated pneumonia.  There is some concern about aspiration vs community acquired.  Ms. Mendoza was admitted to the on call hospitalist.  Cardiology has been asked to see this " "patient in consultation for AFib and an elevated troponin of 0.2.  She has since converted to NSR on her own.  She was unaware of the Afib, denying palpitations/dizziness/skipped beats.  She denies chest pain or dyspnea otherwise.  She has been told previously that she has \"some valve\" disease but that nothing needed to be done with it.  This was based on an echo ~ 2 years ago at [facility] according to the son, who is present at time of consultation.    She does have moderated dementia and has done well at the nursing home.  There have been no cardiac issues or complaints.  She has taken low dose ASA and statin for several years. She does not smoke/chew tobacco, does not consume alcohol, does not consume excessive caffeine.              Social History     Social History   • Marital status:      Spouse name: N/A   • Number of children: N/A   • Years of education: N/A     Occupational History   • Not on file.     Social History Main Topics   • Smoking status: Never Smoker   • Smokeless tobacco: Never Used   • Alcohol use No   • Drug use: No   • Sexual activity: Not on file     Other Topics Concern   • Not on file     Social History Narrative    Lives in assisted living.       History reviewed. No pertinent family history.  History reviewed. No pertinent surgical history.  ROS: Pertinent items are noted in HPI, all other systems reviewed and negative     Objective     height is 154.9 cm (61\") and weight is 62.9 kg (138 lb 9.6 oz). Her oral temperature is 97 °F (36.1 °C). Her blood pressure is 139/68 and her pulse is 70. Her respiration is 18 and oxygen saturation is 92%.    Intake/Output Summary (Last 24 hours) at 11/08/18 0956  Last data filed at 11/08/18 0503   Gross per 24 hour   Intake            356.8 ml   Output                0 ml   Net            356.8 ml         Physical Exam:  General Appearance:    Alert, cooperative, in no acute distress   Head:    Normocephalic, without obvious abnormality, " atraumatic   Eyes:            Lids and lashes normal, conjunctivae and sclerae normal, no   icterus, no pallor, corneas clear, PERRLA. + Glasses   Ears:    Ears appear intact with no abnormalities noted   Throat:   No oral lesions, no thrush, oral mucosa moist   Neck:   No adenopathy, supple, trachea midline, no thyromegaly, no     carotid bruit, no JVD   Back:     No kyphosis present, no scoliosis present, no skin lesions,       erythema or scars, no tenderness to percussion or                   palpation,   range of motion normal   Lungs:     Course breath sounds L>R, few ronchi/no wheezes,respirations regular, even and unlabored    Heart:    Regular rhythm and normal rate, normal S1 and S2, no            murmur, no gallop, no rub, no click       Abdomen:     Normal bowel sounds, no masses, no organomegaly, soft        non-tender, non-distended, no guarding, no rebound                 tenderness       Extremities:   Moves all extremities well,  no cyanosis, no redness, no edema   Pulses:   Pulses palpable and equal bilaterally   Skin:   No bleeding, bruising or rash   Lymph nodes:   No palpable adenopathy   Neurologic:   Cranial nerves 2 - 12 grossly intact, sensation intact, DTR        present and equal bilaterally      I have examined the patient and agree with the above    Results Review:  I personally reviewed the patient's clinical results.    Results from last 7 days  Lab Units 18  0338   WBC 10*3/mm3 6.14   HEMOGLOBIN g/dL 10.6*   HEMATOCRIT % 33.1*   PLATELETS 10*3/mm3 145*       Results from last 7 days  Lab Units 18  0338 18  1232   SODIUM mmol/L 136 132   POTASSIUM mmol/L 3.8 4.0   CHLORIDE mmol/L 103 99   CO2 mmol/L 25.0 25.0   BUN mg/dL 24* 23   CREATININE mg/dL 0.78 0.84   CALCIUM mg/dL 7.6* 8.2*   BILIRUBIN mg/dL  --  0.6   ALK PHOS U/L  --  74   ALT (SGPT) U/L  --  23   AST (SGOT) U/L  --  34*   GLUCOSE mg/dL 97 114*       A1C:  5.8  M    Troponin x1:  0.203    BNP:   807        Results from last 7 days  Lab Units 11/08/18  0339 11/07/18  1854 11/07/18  1232   INR  1.09 1.09 1.08           Results from last 7 days  Lab Units 11/08/18  0338   CHOLESTEROL mg/dL 83   TRIGLYCERIDES mg/dL 47   HDL CHOL mg/dL 36*   LDL CHOL mg/dL 39           Radiology:  Imaging Results (last 72 hours)     Procedure Component Value Units Date/Time    XR Chest 1 View [[id number]] Collected:  11/07/18 2145     Updated:  11/07/18 2309    Narrative:       EXAM:    XR Chest, 1 View     EXAM DATE/TIME:    11/7/2018 9:45 PM     CLINICAL HISTORY:    87 years old, female; Lobar pneumonia, unspecified organism; Hypoxemia;   Shortness of breath.     TECHNIQUE:    XR of the chest, 1 view.     COMPARISON:    CR XR CHEST 1 VW 11/7/2018 1:06 PM     FINDINGS:     Stable cardiomegaly with aortic atherosclerosis. Stable right basilar   airspace consolidation. Possible trace left pleural effusion. No pneumothorax.     No acute osseous abnormality.       Impression:       Stable right basilar airspace consolidation. Possible trace left pleural   effusion.     THIS DOCUMENT HAS BEEN ELECTRONICALLY SIGNED BY DEANA PORTER MD    XR Chest 1 View [[id number]] Collected:  11/07/18 1408     Updated:  11/07/18 1431    Narrative:       EXAMINATION: XR CHEST 1 VW- 11/07/2018     INDICATION: cough, hypoxemia, L lung wheezes     TECHNIQUE:  Single view frontal chest.     COMPARISONS: 08/17/2010     FINDINGS:  Calcified granulomata are seen in the left lung. There is  opacity in the periphery of the right lower lobe. No sizable effusion.  No pneumothorax. Atherosclerosis and tortuosity of the aorta. Prominent  cardiopericardial silhouette.       Impression:       Right lower lobe airspace disease which could represent  pneumonia.     D:  11/07/2018  E:  11/07/2018     This report was finalized on 11/7/2018 2:28 PM by Darrel Ribeiro.               Tele:  Atrial Fibrillation by EKG at admission.  Appears to be in NSR at time of  consultation.    Assessment/Plan     1.  New Onset Atrial Fibrillation   -  Slight RVR in setting of weakness, fall. Remote episode with self resolution.   -  Maintaining NSR with rate control.  At this time, would DC IV Heparin.  Would continue to monitor, hydrate.  Patient is asymptomatic with no previous documentation or history of AFib.  If happens again, would add Lopressor for rate control but at this time would make no medication changes.     -  CHADS2 Vasc = 2.  High fall risk as this is what prompted admission.  Would continue with home dose of 81mg ASA daily.  Given her high fall risk, would not recommend anticoagulation.   -  Echo performed and reviewed.  Normal EF.  Valvular disease noted - acceptable for age.   -  Will check thyroid functions.   -  Will obtain EKG today to document NSR.  At this time, Cardiology will see PRN.  Please call if needed beyond our recommendations noted above.    2.  Hypotension   -  Improved.  Monitor.   -  Hydration of importance to maintain.  Patient is NPO for dysphagia evaluation.  Will start maintenance fluids.    3.  Elevated Troponin   -  Only one lab drawn.  Serial labs not necessary   -  Patient is asymptomatic.  EKG performed and reviewed.  Echo essentially WNL without wall motion abnormalities.  Valvular disease appropriate for age.  Patient not candidate for aggressive interventional measures   -  Would continue with home doses of ASA and statin.    4.  Generalized weakness with fall   -  PT/OT.   -  Safety precautions, monitoring.   -  May need guard rails on bed at time of DC -  can assist.   -  No urine checked at time of admission.  Would check to cover basics.     5.  Pneumonia, possible aspiration   -  Started on abx per hospitalists   -  Afebrile and without leukocytosis.   -  Swallow evaluation today.    6.  Dyslipidemia   -  Lipid panel personally reviewed.   -  Continue home dose of statin and appropriate diet.    7.  Thyroid Disorder   -   Continue chronic supplementation   -  Check labs.    I discussed the patients findings and my recommendations with the patient, any present family members, and the nursing staff.  Chester Barreto MD saw and examined patient, verified hx and PE, read all radiographic studies, reviewed labs and micro data, and formulated dx, plan for treatment and all medical decision making.      Virgen Hall PA-C, working with Chester Barreto MD  11/08/18 9:56 AM  I, Chester Barreto have reviewed the note in full and agree with all aspects of the above including physical exam, assessment, labs and plan with changes made accordingly.     Chester Barreto MD  11/08/18  1:20 PM

## 2018-11-08 NOTE — PROGRESS NOTES
[facility] Medicine Services  PROGRESS NOTE    Patient Name: Katharine Mendoza  : 1931  MRN: [id number]    Date of Admission: 2018  Length of Stay: 1  Primary Care Physician: Samia Carrington MD    Subjective   Subjective     CC:  Fall / hypoxia / respiratory distress    HPI:  Respiratory distress event from overnight.  Overnight doctor called due to patient on 80% non-rebreather.  Pt required NT suctioning.  Called about patient aspirating today.  Son in room - Josh.  Discussed concerns for no safe way to feed and provide medications.  They would like to try NG tube    Review of Systems    Gen- No fevers, chills  CV- No chest pain, palpitations  Resp- No cough, dyspnea  GI- No N/V/D, abd pain    Otherwise ROS is negative except as mentioned in the HPI.    Objective   Objective     Vital Signs:   Temp:  [97 °F (36.1 °C)-98 °F (36.7 °C)] 97.4 °F (36.3 °C)  Heart Rate:  [] 81  Resp:  [18-26] 18  BP: ()/(48-80) 92/56     Physical Exam:    Constitutional: No acute distress, awake, alert  HENT: NCAT, mucous membranes moist  Respiratory: poor inspiratory effort, rhonchi bilaterally, junky breath sounds of secretions in airways  Cardiovascular: RRR, s1 and s2  Gastrointestinal: Positive bowel sounds, soft, nontender, nondistended  Musculoskeletal: No bilateral ankle edema  Psychiatric: Appropriate affect, cooperative  Neurologic: Oriented x 3, strength symmetric in all extremities, Cranial Nerves grossly intact to confrontation, speech clear  Skin: No rashes    Results Reviewed:  I have personally reviewed current lab, radiology, and data and agree.      Results from last 7 days  Lab Units 18  0339 18  0338 18  1854 18  1232   WBC 10*3/mm3  --  6.14  --  9.07   HEMOGLOBIN g/dL  --  10.6*  --  11.9   HEMATOCRIT %  --  33.1*  --  36.8   PLATELETS 10*3/mm3  --  145*  --  154   INR  1.09  --  1.09 1.08       Results from last 7 days  Lab Units  11/08/18  0338 11/07/18  1232   SODIUM mmol/L 136 132   POTASSIUM mmol/L 3.8 4.0   CHLORIDE mmol/L 103 99   CO2 mmol/L 25.0 25.0   BUN mg/dL 24* 23   CREATININE mg/dL 0.78 0.84   GLUCOSE mg/dL 97 114*   CALCIUM mg/dL 7.6* 8.2*   ALT (SGPT) U/L  --  23   AST (SGOT) U/L  --  34*   TROPONIN I ng/mL  --  0.203*     Estimated Creatinine Clearance: 42.1 mL/min (by C-G formula based on SCr of 0.78 mg/dL).  BNP   Date Value Ref Range Status   11/07/2018 807.0 (H) 0.0 - 100.0 pg/mL Final     Comment:     Results may be falsely decreased if patient taking Biotin.       Microbiology Results Abnormal     Procedure Component Value - Date/Time    Blood Culture - Blood, [[id number]]  (Normal) Collected:  11/07/18 1225    Lab Status:  Preliminary result Specimen:  Blood from Wrist, Right Updated:  11/08/18 1300     Blood Culture No growth at 24 hours    Blood Culture - Blood, [[id number]]  (Normal) Collected:  11/07/18 1210    Lab Status:  Preliminary result Specimen:  Blood from Arm, Right Updated:  11/08/18 1300     Blood Culture No growth at 24 hours    Blood Culture - Blood, [[id number]]  (Normal) Collected:  11/07/18 1946    Lab Status:  Preliminary result Specimen:  Blood from Hand, Left Updated:  11/08/18 0800     Blood Culture No growth at less than 24 hours    Blood Culture - Blood, [[id number]]  (Normal) Collected:  11/07/18 1946    Lab Status:  Preliminary result Specimen:  Blood from Arm, Right Updated:  11/08/18 0800     Blood Culture No growth at less than 24 hours    Influenza A & B, RT PCR - Swab, Nasopharynx [[id number]]  (Normal) Collected:  11/07/18 1326    Lab Status:  Final result Specimen:  Swab from Nasopharynx Updated:  11/07/18 1436     Influenza A PCR Not Detected     Influenza B PCR Not Detected    Influenza A & B, RT PCR - Swab, Nasopharynx [[id number]] Collected:  11/07/18 1239    Lab Status:  Edited Result - FINAL Specimen:  Swab from Nasopharynx Updated:  11/07/18 1304     Influenza A PCR --     Comment:  Wrong swab submitted. Recollect requested.   Corrected result. Previous result was Not Detected on 11/7/2018 at 1259 EST.        Influenza B PCR --     Comment: Wrong swab submitted. Recollect requested.   Corrected result. Previous result was Not Detected on 11/7/2018 at 1259 EST.             Imaging Results (last 24 hours)     Procedure Component Value Units Date/Time    FL Video Swallow With Speech [[id number]] Collected:  11/08/18 1416     Updated:  11/08/18 1416    Narrative:       EXAMINATION: FL VIDEO SWALLOW W SPEECH-     INDICATION: DYSPHAGIA, OROPHARYNGEAL, HAS ATTRIBUTABLE CAUSE     TECHNIQUE: 2 minutes of fluoroscopic time was used for this exam. 1  associated image was saved. The patient was evaluated in the seated  lateral position while taking a variety of consistencies of barium by  mouth under the direction of speech pathology.     COMPARISON: NONE     FINDINGS: There was aspiration during sips of thin, nectar consistency  barium. There was a moderate amount of residue in the vallecula, and  performed sinuses after swallows with all media attempted. Residue  resulted in eventual aspiration.          Impression:       Fluoroscopy provided for a modified barium swallow. Please  see speech therapy report for full details and recommendations.           XR Chest 1 View [[id number]] Collected:  11/07/18 2145     Updated:  11/07/18 2309    Narrative:       EXAM:    XR Chest, 1 View     EXAM DATE/TIME:    11/7/2018 9:45 PM     CLINICAL HISTORY:    87 years old, female; Lobar pneumonia, unspecified organism; Hypoxemia;   Shortness of breath.     TECHNIQUE:    XR of the chest, 1 view.     COMPARISON:    CR XR CHEST 1 VW 11/7/2018 1:06 PM     FINDINGS:     Stable cardiomegaly with aortic atherosclerosis. Stable right basilar   airspace consolidation. Possible trace left pleural effusion. No pneumothorax.     No acute osseous abnormality.       Impression:       Stable right basilar airspace consolidation.  Possible trace left pleural   effusion.     THIS DOCUMENT HAS BEEN ELECTRONICALLY SIGNED BY DEANA PORTER MD        Results for orders placed during the hospital encounter of 11/07/18   Adult Transthoracic Echo Complete W/ Cont if Necessary Per Protocol    Narrative · Mild to moderate aortic valve regurgitation is present.  · Mild-to-moderate mitral valve regurgitation is present.  · Mild tricuspid valve regurgitation is present.  · Left ventricular wall thickness is consistent with mild concentric   hypertrophy.  · Left ventricular systolic function is normal. Estimated EF = 70%.  · There is no evidence of pericardial effusion.  · No evidence of pulmonary hypertension is present.  · The aortic valve exhibits sclerosis.  · Moderate MAC is present.  · Normal right ventricular cavity size, wall thickness, systolic function   and septal motion noted.          I have reviewed the medications.      aspirin 81 mg Oral Daily   atorvastatin 10 mg Oral Nightly   azithromycin 250 mg Intravenous Q24H   cholecalciferol 1,000 Units Oral Daily   donepezil 10 mg Oral Nightly   levothyroxine 88 mcg Oral Once per day on Mon Tue Wed Thu Fri Sat   melatonin 2.5 mg Sublingual Nightly   multivitamin with minerals 1 tablet Oral Daily   piperacillin-tazobactam 3.375 g Intravenous Q8H   sodium chloride 3 mL Intravenous Q12H     Assessment/Plan   Assessment / Plan     Active Hospital Problems    Diagnosis   • **Possible Aspiration pneumonia (CMS/HCC), right lower lobe   • Accident due to mechanical fall without injury   • Hypotension   • New onset Atrial fibrillation with rapid ventricular response (CMS/HCC)   • Elevated troponin   • Acute respiratory failure with hypoxia (CMS/HCC)   • Late onset Alzheimer's disease without behavioral disturbance   • Community acquired pneumonia of right lower lobe of lung (CMS/HCC)     Brief Hospital Course to date:  Katharine Mendoza is a 87 y.o. female from a nursing home who presented after  fall.    Assessment & Plan:    Fall  - fall precautions  Aspiration  - place NG tube today to protect airways  - she had respiratory event overnight and needed suctioning  Acute Hypoxic Respiratory  - appears to have aspiration  - NPO due to respiratory event overnight  Elevated Troponin  - no need to trend  - not a candidate for intervention  New Onset A Fib RVR  - high fall risk  - no anticoagulation  Dementia  - unclear how severe  Weakness    NPO due to respiratory distress and aspiration  NG tube today    DVT Prophylaxis:  Heparin SC    CODE STATUS:   Code Status and Medical Interventions:   Ordered at: 11/07/18 3171     Limited Support to NOT Include:    Intubation     Code Status:    CPR     Medical Interventions (Level of Support Prior to Arrest):    Limited     Comments:    Discussed with patient's son who wanted full code currently but plans to review her living will and bring it to the hospital     Disposition: I expect the patient to be discharged TBD    Electronically signed by Asim Bravo MD, 11/08/18, 2:42 PM.

## 2018-11-08 NOTE — PROCEDURES
Critical Care  Performed by: FLORENCIA MICHAEL  Authorized by: FLORENCIA MICHAEL   Total critical care time: 30 minutes  Critical care time was exclusive of separately billable procedures and treating other patients.  Critical care was necessary to treat or prevent imminent or life-threatening deterioration of the following conditions: respiratory failure.  Critical care was time spent personally by me on the following activities: blood draw for specimens, development of treatment plan with patient or surrogate, evaluation of patient's response to treatment, examination of patient, obtaining history from patient or surrogate, ordering and performing treatments and interventions, ordering and review of radiographic studies, pulse oximetry and re-evaluation of patient's condition.  Comments:   Called for pt w/ respiratory distress requiring 80% NRB to maintain sats in 90's.  Pt requiring NT suction as she is unable to clear own secretions.  Large amounts of thick copious secretions from suctioning.  On exam pt w/ labored breathing and coarse rhonchorous breath sounds but no crackles or wheeze.  Pt unable to provide any history.      cxr obtained and does not appear to be worse.  abg pending.      Plan more frequent NT suctioning, trial of robinul.  Discussed plan w/ son including pts wishes and he states pt is DNI.

## 2018-11-08 NOTE — THERAPY EVALUATION
Acute Care - Speech Language Pathology   Swallow Initial Evaluation [facility]   Clinical Swallow Evaluation       Patient Name: Katharine Mendoza  : 1931  MRN: [id number]  Today's Date: 2018               Admit Date: 2018    Visit Dx:     ICD-10-CM ICD-9-CM   1. Community acquired pneumonia of right lower lobe of lung (CMS/HCC) J18.1 481   2. Hypoxemia R09.02 799.02   3. Acute respiratory failure with hypoxia (CMS/HCC) J96.01 518.81   4. Dysphagia, unspecified type R13.10 787.20     Patient Active Problem List   Diagnosis   • Possible Aspiration pneumonia (CMS/HCC), right lower lobe   • Accident due to mechanical fall without injury   • Hypotension   • New onset Atrial fibrillation with rapid ventricular response (CMS/HCC)   • Elevated troponin   • Acute respiratory failure with hypoxia (CMS/HCC)   • Late onset Alzheimer's disease without behavioral disturbance   • Community acquired pneumonia of right lower lobe of lung (CMS/HCC)     Past Medical History:   Diagnosis Date   • Anxiety    • Dementia    • Disease of thyroid gland    • Hyperlipidemia    • Osteoporosis      History reviewed. No pertinent surgical history.       SWALLOW EVALUATION (last 72 hours)      SLP Adult Swallow Evaluation     Row Name 18 1045                Rehab Evaluation    Document Type (P)  evaluation  -TC       Subjective Information (P)  no complaints  -TC       Patient Observations (P)  alert;cooperative  -TC       Patient/Family Observations (P)  Son present   -TC       Patient Effort (P)  adequate  -TC       Symptoms Noted During/After Treatment (P)  none  -TC          General Information    Patient Profile Reviewed (P)  yes  -TC       Pertinent History Of Current Problem (P)  Adm w/ suspected aspiration PNA, afib, and respiratory insufficiency. H/o Alzheimer's disease and hypotension.   -TC       Current Method of Nutrition (P)  NPO  -TC       Precautions/Limitations, Vision (P)  WFL with corrective  lenses;for purposes of eval  -TC       Precautions/Limitations, Hearing (P)  WFL;for purposes of eval  -TC       Prior Level of Function-Communication (P)  cognitive-linguistic impairment   h/o Alzheimer's dementia   -TC       Prior Level of Function-Swallowing (P)  no diet consistency restrictions  -TC       Plans/Goals Discussed with (P)  patient and family;agreed upon  -TC       Barriers to Rehab (P)  cognitive status  -TC       Patient's Goals for Discharge (P)  patient did not state  -TC          Pain Assessment    Additional Documentation (P)  Pain Scale: Numbers Pre/Post-Treatment (Group)  -TC          Pain Scale: Numbers Pre/Post-Treatment    Pain Scale: Numbers, Pretreatment (P)  0/10 - no pain  -TC       Pain Scale: Numbers, Post-Treatment (P)  0/10 - no pain  -TC          Oral Motor and Function    Dentition Assessment (P)  missing teeth  -TC       Secretion Management (P)  WNL/WFL  -TC       Mucosal Quality (P)  dry  -TC       Volitional Swallow (P)  weak  -TC       Volitional Cough (P)  weak  -TC          Oral Musculature and Cranial Nerve Assessment    Oral Motor General Assessment (P)  generalized oral motor weakness  -TC          General Eating/Swallowing Observations    Respiratory Support Currently in Use (P)  nasal cannula  -TC       Eating/Swallowing Skills (P)  self-fed;fed by SLP  -TC       Positioning During Eating (P)  upright in bed  -TC       Utensils Used (P)  spoon;cup;straw  -TC       Consistencies Trialed (P)  thin liquids;nectar/syrup-thick liquids;pureed;regular textures  -TC          Respiratory    Respiratory Status (P)  reduction in SpO2;during swallowing/eating  -TC          Clinical Swallow Eval    Oral Prep Phase (P)  impaired  -TC       Oral Transit (P)  WFL  -TC       Oral Residue (P)  WFL  -TC       Pharyngeal Phase (P)  suspected pharyngeal impairment  -TC       Clinical Swallow Evaluation Summary (P)  Clinical swallow eval completed. Trials given of thin via tsp and cup,  nectar-thick via tsp, cup, and straw, pureed, and solid consistencies. Pt demonstrating multiple swallows w/ all consistencies. Cough after liquid wash w/ thin via cup. Pt w/ decr'd SpO2 intermittently t/o eval. Recommend NPO w/ meds alternate route, continue oral care BID/PRN. Will plan for MBS today to determine pt's least restrictive diet.    -TC          Oral Prep Concerns    Oral Prep Concerns (P)  increased prep time  -TC       Increased Prep Time (P)  pudding;regular consistencies  -TC          Pharyngeal Phase Concerns    Pharyngeal Phase Concerns (P)  cough;multiple swallows  -TC       Multiple Swallows (P)  all consistencies  -TC       Cough (P)  other (see comments)   liquid wash w/ thin  -TC          Clinical Impression    SLP Swallowing Diagnosis (P)  suspected pharyngeal dysfunction  -TC       Functional Impact (P)  risk of aspiration/pneumonia  -TC       Rehab Potential/Prognosis, Swallowing (P)  good, to achieve stated therapy goals  -TC       Swallow Criteria for Skilled Therapeutic Interventions Met (P)  demonstrates skilled criteria  -TC          Recommendations    Therapy Frequency (Swallow) (P)  --  -TC       Predicted Duration Therapy Intervention (Days) (P)  --  -TC       SLP Diet Recommendation (P)  NPO  -TC       Recommended Diagnostics (P)  VFSS (MBS)  -TC       SLP Rec. for Method of Medication Administration (P)  meds via alternate route  -TC       Anticipated Dischage Disposition (P)  unknown;anticipate therapy at next level of care  -TC         User Key  (r) = Recorded By, (t) = Taken By, (c) = Cosigned By    Initials Name Effective Dates    TC Guera Burris, Speech Therapy Student 08/06/18 -         EDUCATION  The patient has been educated in the following areas:   Dysphagia (Swallowing Impairment) Oral Care/Hydration NPO rationale.    SLP Recommendation and Plan  SLP Swallowing Diagnosis: (P) suspected pharyngeal dysfunction  SLP Diet Recommendation: (P) NPO           Recommended  Diagnostics: (P) VFSS (MBS)  Swallow Criteria for Skilled Therapeutic Interventions Met: (P) demonstrates skilled criteria  Anticipated Dischage Disposition: (P) unknown, anticipate therapy at next level of care  Rehab Potential/Prognosis, Swallowing: (P) good, to achieve stated therapy goals                              Time Calculation:         Time Calculation- SLP     Row Name 11/08/18 1518             Time Calculation- SLP    SLP Start Time (P)  1045  -TC      SLP Received On (P)  11/08/18  -TC        User Key  (r) = Recorded By, (t) = Taken By, (c) = Cosigned By    Initials Name Provider Type    TC Guera Burris, Speech Therapy Student Speech Therapy Student          Therapy Charges for Today     Code Description Service Date Service Provider Modifiers Qty    25649521440 HC ST EVAL ORAL PHARYNG SWALLOW 5 11/8/2018 Guera Burris, Speech Therapy Student GN 1               Guera Burris, Speech Therapy Student  11/8/2018

## 2018-11-08 NOTE — PLAN OF CARE
Problem: Patient Care Overview  Goal: Plan of Care Review  Outcome: Ongoing (interventions implemented as appropriate)   11/08/18 1045   Coping/Psychosocial   Plan of Care Reviewed With patient;family   SLP clinical swallow evaluation completed. Will continue to address suspected pharyngeal dysphagia. Recommend NPO w/ meds alternate route, continue oral care BID/PRN. Will plan for MBS today to determine pt's least restrictive diet. Please see note for further details and recommendations.

## 2018-11-08 NOTE — PLAN OF CARE
Problem: Patient Care Overview  Goal: Plan of Care Review  Outcome: Ongoing (interventions implemented as appropriate)   11/08/18 3175   Coping/Psychosocial   Plan of Care Reviewed With patient   SLP MBS evaluation completed. Will address moderate-severe oropharyngeal dysphagia. Pt aspirated large amounts of thin and nectar-thick liquids. No aspiration w/ Honey-thick liquids or pudding consistency. Aspiration was SILENT. RECS: Honey-thick liquids, no straws, small bites/sips, Level 2-pureed, meds crushed only in pureed, general aspiration precautions, Oral care BID/PRN, initiate dysphagia tx. Please see note for further details and recommendations.

## 2018-11-08 NOTE — MBS/VFSS/FEES
Acute Care - Speech Language Pathology   Swallow Initial Evaluation  [facility]   Modified Barium Swallow Study (MBS)     Patient Name: Katharine Mendoza  : 1931  MRN: [id number]  Today's Date: 2018               Admit Date: 2018    Visit Dx:     ICD-10-CM ICD-9-CM   1. Community acquired pneumonia of right lower lobe of lung (CMS/HCC) J18.1 481   2. Hypoxemia R09.02 799.02   3. Acute respiratory failure with hypoxia (CMS/HCC) J96.01 518.81   4. Oropharyngeal dysphagia R13.12 787.22     Patient Active Problem List   Diagnosis   • Possible Aspiration pneumonia (CMS/HCC), right lower lobe   • Accident due to mechanical fall without injury   • Hypotension   • New onset Atrial fibrillation with rapid ventricular response (CMS/HCC)   • Elevated troponin   • Acute respiratory failure with hypoxia (CMS/HCC)   • Late onset Alzheimer's disease without behavioral disturbance   • Community acquired pneumonia of right lower lobe of lung (CMS/HCC)     Past Medical History:   Diagnosis Date   • Anxiety    • Dementia    • Disease of thyroid gland    • Hyperlipidemia    • Osteoporosis      History reviewed. No pertinent surgical history.       SWALLOW EVALUATION (last 72 hours)      SLP Adult Swallow Evaluation     Row Name 18 1345       Rehab Evaluation    Document Type evaluation  -RD    Subjective Information no complaints  -RD    Patient Observations alert;cooperative;agree to therapy  -RD    Patient/Family Observations Son present  -RD    Patient Effort adequate  -RD    Symptoms Noted During/After Treatment  --       General Information    Patient Profile Reviewed yes  -RD    Pertinent History Of Current Problem Adm w/ suspected aspiration PNA, afib, and respiratory insufficiency. H/o Alzheimer's disease and hypotension.   -RD    Current Method of Nutrition NPO  -RD    Precautions/Limitations, Vision WFL with corrective lenses;for purposes of eval  -RD    Precautions/Limitations, Hearing WFL;for  "purposes of eval  -RD    Prior Level of Function-Communication cognitive-linguistic impairment;other (see comments)   h/o Alz dz  -RD    Prior Level of Function-Swallowing no diet consistency restrictions  -RD    Plans/Goals Discussed with patient and family;agreed upon  -RD    Barriers to Rehab cognitive status  -RD    Patient's Goals for Discharge return to PO diet  -RD       Pain Assessment    Additional Documentation  --       Pain Scale: Numbers Pre/Post-Treatment    Pain Scale: Numbers, Pretreatment 0/10 - no pain  -RD    Pain Scale: Numbers, Post-Treatment 0/10 - no pain  -RD       MBS/VFSS    Utensils Used spoon;cup;straw  -RD    Consistencies Trialed thin liquids;nectar/syrup-thick liquids;honey-thick liquids;pudding thick;regular textures  -RD       MBS/VFSS Interpretation    Oral Prep Phase impaired oral phase of swallowing  -RD    Oral Transit Phase impaired  -RD    Oral Residue impaired  -RD       Oral Preparatory Phase    Oral Preparatory Phase oral holding;prolonged manipulation  -RD    Oral Holding all consistencies tested;secondary to impaired cognitive status  -RD    Prolonged Manipulation all consistencies tested;secondary to reduced lingual strength;secondary to reduced lingual range of motion;secondary to impaired cognitive status  -RD    Oral Preparatory Phase, Comment Prolonged manipulation ? w/ solids. Oral holding t/o eval.   -RD       Oral Transit Phase    Impaired Oral Transit Phase increased A-P transit time;premature spillage of liquids into pharynx  -RD    Increased A-P Transit Time all consistencies tested;secondary to reduced lingual control;secondary to impaired cognitive status  -RD    Premature Spillage of Liquids into Pharynx thin liquids;nectar-thick liquids  -RD    Oral Transit Phase, Comment Pt \"swishing\" liquids in mouth t/o eval. Incr'd transit time. Pre-spill w/ liquids.   -RD       Oral Residue    Impaired Oral Residue diffuse residue throughout oral cavity  -RD    Diffuse " Residue throughout Oral Cavity all consistencies tested;secondary to reduced lingual strength;secondary to reduced lingual range of motion  -RD    Response to Oral Residue other (see comments)   residue reduced w/ spontaneous 2nd swallow  -RD       Initiation of Pharyngeal Swallow    Initiation of Pharyngeal Swallow bolus in pyriform sinuses  -RD    Pharyngeal Phase impaired pharyngeal phase of swallowing  -RD    Penetration Before the Swallow thin liquids;secondary to reduced back of tongue control;secondary to delayed swallow initiation or mistiming  -RD    Penetration During the Swallow thin liquids;nectar-thick liquids;secondary to delayed swallow initiation or mistiming;secondary to reduced laryngeal elevation;secondary to reduced vestibular closure;other (see comments)   penetration didn't clear and deepened to TVFs  -RD    Aspiration After the Swallow thin liquids;nectar-thick liquids;secondary to residue;in laryngeal vestibule  -RD    Response to Penetration no response;deep  -RD    Response to Aspiration no response, silent aspiration;could not clear subglottic material  -RD    Rosenbek's Scale thin:;nectar:;8--->level 8  -RD    Pharyngeal Residue all consistencies tested;base of tongue;valleculae;pyriform sinuses;posterior pharyngeal wall;laryngeal vestibule;diffuse within pharynx;secondary to reduced base of tongue retraction;secondary to reduced posterior pharyngeal wall stripping;secondary to reduced laryngeal elevation;other (see comments)   > in valleculae and incr'd w/ solids  -RD    Response to Residue unable to clear residue;other (see comments)   residue reduced w/ subsequent swallows  -RD    Attempted Compensatory Maneuvers bolus size;bolus presentation style;alternative liquids/solids;throat clear after swallow;other (see comments)   pt w/ difficulty following commands  -RD    Response to Attempted Compensatory Maneuvers prevented aspiration;did not reduce residue  -RD    Pharyngeal Phase,  Comment Moderate-severe oropharyngeal dysphagia. Penetration occurred before/during the swallow w/ thins and during the swallow w/ nectar-thick liquids 2' mistiming/delay and reduced vestibular closure. Penetration did not clear and deepened w/ continued trials. Pt eventually aspirated thin and nectar-thick vestibular residue t/o exam. Aspiration was silent. No penetration/aspiration observed w/ honey-thick liquids or pudding. Moderate diffuse pharyngeal residue present w/ all consistencies (> in valleculae and incr'd w/ solids) 2' reduced base of tongue retraction, decr'd pharyngeal stripping wave, and reduced elevation. Residue reduced w/ subsequent swallows, but unable to fully clear.   -RD       Clinical Impression    SLP Swallowing Diagnosis mod-severe;oral dysfunction;pharyngeal dysfunction  -RD    Functional Impact risk of aspiration/pneumonia;risk of malnutrition;risk of dehydration  -RD    Rehab Potential/Prognosis, Swallowing adequate, monitor progress closely  -RD    Swallow Criteria for Skilled Therapeutic Interventions Met demonstrates skilled criteria  -RD       Recommendations    Therapy Frequency (Swallow) 5 days per week  -RD    Predicted Duration Therapy Intervention (Days) until discharge  -RD    SLP Diet Recommendation puree;honey thick liquids  -RD    Recommended Diagnostics reassess via VFSS (MBS);other (see comments)   when indicated by SLP  -RD    Recommended Precautions and Strategies upright posture during/after eating;small bites of food and sips of liquid;no straw;multiple swallows per bite of food;multiple swallows per sip of liquid;other (see comments)   general aspiration precautions; oral care BID/PRN  -RD    SLP Rec. for Method of Medication Administration meds crushed;with pudding or applesauce  -RD    Monitor for Signs of Aspiration yes;notify SLP if any concerns  -RD    Anticipated Dischage Disposition unknown;anticipate therapy at next level of care  -RD      User Key  (r) =  Recorded By, (t) = Taken By, (c) = Cosigned By    Initials Name Effective Dates    RD Marya Elias, MS CCC-SLP 04/03/18 -     Guera Vargas, Speech Therapy Student 08/06/18 -         EDUCATION  The patient has been educated in the following areas:   Dysphagia (Swallowing Impairment) Oral Care/Hydration Modified Diet Instruction.    SLP Recommendation and Plan  SLP Swallowing Diagnosis: mod-severe, oral dysfunction, pharyngeal dysfunction  SLP Diet Recommendation: puree, honey thick liquids  Recommended Precautions and Strategies: upright posture during/after eating, small bites of food and sips of liquid, no straw, multiple swallows per bite of food, multiple swallows per sip of liquid, other (see comments) (general aspiration precautions; oral care BID/PRN)     Monitor for Signs of Aspiration: yes, notify SLP if any concerns  Recommended Diagnostics: reassess via VFSS (MBS), other (see comments) (when indicated by SLP)  Swallow Criteria for Skilled Therapeutic Interventions Met: demonstrates skilled criteria  Anticipated Dischage Disposition: unknown, anticipate therapy at next level of care  Rehab Potential/Prognosis, Swallowing: adequate, monitor progress closely  Therapy Frequency (Swallow): 5 days per week  Predicted Duration Therapy Intervention (Days): until discharge       Plan of Care Reviewed With: patient  Plan of Care Review  Plan of Care Reviewed With: patient          SLP GOALS     Row Name 11/08/18 7165             Oral Nutrition/Hydration Goal 1 (SLP)    Oral Nutrition/Hydration Goal 1, SLP LTG: Pt will return to soft whole foods and thin liquids w/ 100% accuracy w/o cues.   -RD      Time Frame (Oral Nutrition/Hydration Goal 1, SLP) by discharge  -RD         Oral Nutrition/Hydration Goal 2 (SLP)    Oral Nutrition/Hydration Goal 2, SLP Pt will tolerate small bites/sips of Honey-thick liquids and Level 2-pureed w/ no overt s/s of aspiration or complications w/ 100% accuracy w/o cues.   -RD       Time Frame (Oral Nutrition/Hydration Goal 2, SLP) short term goal (STG);by discharge  -RD         Lingual Strengthening Goal 1 (SLP)    Activity (Lingual Strengthening Goal 1, SLP) increase tongue back strength  -RD      Increase Tongue Back Strength lingual resistance exercises  -RD      Padroni/Accuracy (Lingual Strengthening Goal 1, SLP) with minimal cues (75-90% accuracy)  -RD      Time Frame (Lingual Strengthening Goal 1, SLP) short term goal (STG);by discharge  -RD         Pharyngeal Strengthening Exercise Goal 1 (SLP)    Activity (Pharyngeal Strengthening Goal 1, SLP) increase timing;increase superior movement of the hyolaryngeal complex;increase closure at entrance to airway/closure of airway at glottis;increase squeeze/positive pressure generation;increase tongue base retraction  -RD      Increase Timing prepping - 3 second prep or suck swallow or 3-step swallow  -RD      Increase Superior Movement of the Hyolaryngeal Complex supraglottic swallow  -RD      Increase Closure at Entrance to Airway/Closure of Airway at Glottis super-supraglottic swallow  -RD      Increase Squeeze/Positive Pressure Generation effortful pitch glide (falsetto + pharyngeal squeeze)  -RD      Increase Tongue Base Retraction hard effortful swallow  -RD      Padroni/Accuracy (Pharyngeal Strengthening Goal 1, SLP) with minimal cues (75-90% accuracy)  -RD      Time Frame (Pharyngeal Strengthening Goal 1, SLP) short term goal (STG);by discharge  -RD         Swallow Management Recall Goal 1 (SLP)    Activity (Swallow Management Recall Goal 1, SLP) recall of;safe diet/liquid level;safe liquid viscosity;compensatory swallow strategies/techniques  -RD      Padroni/Accuracy (Swallow Management Recall Goal 1, SLP) with minimal cues (75-90% accuracy)  -RD      Time Frame (Swallow Management Recall Goal 1, SLP) short term goal (STG);by discharge  -RD        User Key  (r) = Recorded By, (t) = Taken By, (c) = Cosigned By     Initials Name Provider Type    Marya Alcocer MS CCC-SLP Speech and Language Pathologist               Time Calculation:         Time Calculation- SLP     Row Name 11/08/18 1635 11/08/18 1518          Time Calculation- SLP    SLP Start Time 1345  -RD 1045  -RD (r) TC (t) RD (c)     SLP Received On 11/08/18  -RD 11/08/18  -RD (r) TC (t) RD (c)       User Key  (r) = Recorded By, (t) = Taken By, (c) = Cosigned By    Initials Name Provider Type    Mayra Alcocer MS CCC-SLP Speech and Language Pathologist    Guera Vargas, Speech Therapy Student Speech Therapy Student          Therapy Charges for Today     Code Description Service Date Service Provider Modifiers Qty    89078892335 HC ST MOTION FLUORO EVAL SWALLOW 6 11/8/2018 Marya Elias MS CCC-SLP GN 1               Marya Elias MS CCC-SLP  11/8/2018

## 2018-11-08 NOTE — PLAN OF CARE
Problem: Patient Care Overview  Goal: Plan of Care Review  Outcome: Ongoing (interventions implemented as appropriate)   11/08/18 1521   Coping/Psychosocial   Plan of Care Reviewed With patient;family   Plan of Care Review   Progress improving   OTHER   Outcome Summary KRISSY GARZA. NPO, OMEGA today.

## 2018-11-09 PROCEDURE — 97535 SELF CARE MNGMENT TRAINING: CPT

## 2018-11-09 PROCEDURE — 92526 ORAL FUNCTION THERAPY: CPT

## 2018-11-09 PROCEDURE — 25010000002 AZITHROMYCIN PER 500 MG: Performed by: INTERNAL MEDICINE

## 2018-11-09 PROCEDURE — 25010000002 HEPARIN (PORCINE) PER 1000 UNITS: Performed by: HOSPITALIST

## 2018-11-09 PROCEDURE — 25010000002 PIPERACILLIN SOD-TAZOBACTAM PER 1 G: Performed by: HOSPITALIST

## 2018-11-09 PROCEDURE — 97162 PT EVAL MOD COMPLEX 30 MIN: CPT

## 2018-11-09 PROCEDURE — 97166 OT EVAL MOD COMPLEX 45 MIN: CPT

## 2018-11-09 PROCEDURE — 99232 SBSQ HOSP IP/OBS MODERATE 35: CPT | Performed by: HOSPITALIST

## 2018-11-09 RX ADMIN — HEPARIN SODIUM 5000 UNITS: 5000 INJECTION, SOLUTION INTRAVENOUS; SUBCUTANEOUS at 05:21

## 2018-11-09 RX ADMIN — TAZOBACTAM SODIUM AND PIPERACILLIN SODIUM 3.38 G: 375; 3 INJECTION, SOLUTION INTRAVENOUS at 08:54

## 2018-11-09 RX ADMIN — TAZOBACTAM SODIUM AND PIPERACILLIN SODIUM 3.38 G: 375; 3 INJECTION, SOLUTION INTRAVENOUS at 17:46

## 2018-11-09 RX ADMIN — ASPIRIN 81 MG: 81 TABLET, COATED ORAL at 08:54

## 2018-11-09 RX ADMIN — VITAMIN D, TAB 1000IU (100/BT) 1000 UNITS: 25 TAB at 08:55

## 2018-11-09 RX ADMIN — ATORVASTATIN CALCIUM 10 MG: 10 TABLET, FILM COATED ORAL at 21:17

## 2018-11-09 RX ADMIN — Medication 3 ML: at 21:18

## 2018-11-09 RX ADMIN — LEVOTHYROXINE SODIUM 88 MCG: 0.09 TABLET ORAL at 05:21

## 2018-11-09 RX ADMIN — HEPARIN SODIUM 5000 UNITS: 5000 INJECTION, SOLUTION INTRAVENOUS; SUBCUTANEOUS at 16:15

## 2018-11-09 RX ADMIN — Medication 3 ML: at 08:54

## 2018-11-09 RX ADMIN — AZITHROMYCIN MONOHYDRATE 250 MG: 500 INJECTION, POWDER, LYOPHILIZED, FOR SOLUTION INTRAVENOUS at 16:14

## 2018-11-09 RX ADMIN — MULTIPLE VITAMINS W/ MINERALS TAB: TAB ORAL at 08:54

## 2018-11-09 RX ADMIN — DONEPEZIL HYDROCHLORIDE 10 MG: 10 TABLET, FILM COATED ORAL at 21:17

## 2018-11-09 RX ADMIN — TAZOBACTAM SODIUM AND PIPERACILLIN SODIUM 3.38 G: 375; 3 INJECTION, SOLUTION INTRAVENOUS at 03:34

## 2018-11-09 RX ADMIN — HEPARIN SODIUM 5000 UNITS: 5000 INJECTION, SOLUTION INTRAVENOUS; SUBCUTANEOUS at 21:16

## 2018-11-09 RX ADMIN — Medication 2.5 MG: at 21:16

## 2018-11-09 NOTE — PLAN OF CARE
Problem: Patient Care Overview  Goal: Plan of Care Review  Outcome: Ongoing (interventions implemented as appropriate)   11/09/18 0235   Coping/Psychosocial   Plan of Care Reviewed With patient;family   Plan of Care Review   Progress improving   OTHER   Outcome Summary OT eval completed Pt presents with deficits in strength, balance, and activity tolerance limiting ADL completion. Min A for STS and CGA for functional mobility to [facility], mod A for clothing management and donning undergarment. Recom IPOT d/c [facility] with 24/7 supervision and HHOT

## 2018-11-09 NOTE — PROGRESS NOTES
[facility] Medicine Services  PROGRESS NOTE    Patient Name: Katharine Mendoza  : 1931  MRN: [id number]    Date of Admission: 2018  Length of Stay: 2  Primary Care Physician: Samia Carrington MD    Subjective   Subjective     CC:  Fall / hypoxia / respiratory distress    HPI:  No overnight events reported.  Sitting up today.   Denies any chest pain or shortness of breath.  No family in room today.   Son was here yesterday though.    Review of Systems    Gen- No fevers, chills  CV- No chest pain, palpitations  Resp- No cough, dyspnea  GI- No N/V/D, abd pain    Otherwise ROS is negative except as mentioned in the HPI.    Objective   Objective     Vital Signs:   Temp:  [97.7 °F (36.5 °C)-98.2 °F (36.8 °C)] 97.7 °F (36.5 °C)  Heart Rate:  [] 87  Resp:  [16-18] 18  BP: (104-123)/(51-77) 123/61     Physical Exam:    Constitutional: No acute distress, awake, alert  HENT: NCAT, mucous membranes moist  Respiratory: poor inspiratory effort, rhonchi bilaterally, junky breath sounds of secretions in airways  Cardiovascular: RRR, s1 and s2  Gastrointestinal: Positive bowel sounds, soft, nontender, nondistended  Musculoskeletal: No bilateral ankle edema  Psychiatric: Appropriate affect, cooperative  Neurologic: Oriented x 3, strength symmetric in all extremities, Cranial Nerves grossly intact to confrontation, speech clear  Skin: No rashes    Results Reviewed:  I have personally reviewed current lab, radiology, and data and agree.      Results from last 7 days  Lab Units 18  03318  1854 18  1232   WBC 10*3/mm3  --  6.14  --  9.07   HEMOGLOBIN g/dL  --  10.6*  --  11.9   HEMATOCRIT %  --  33.1*  --  36.8   PLATELETS 10*3/mm3  --  145*  --  154   INR  1.09  --  1.09 1.08       Results from last 7 days  Lab Units 18  0338 18  1232   SODIUM mmol/L 136 132   POTASSIUM mmol/L 3.8 4.0   CHLORIDE mmol/L 103 99   CO2 mmol/L 25.0 25.0   BUN mg/dL  24* 23   CREATININE mg/dL 0.78 0.84   GLUCOSE mg/dL 97 114*   CALCIUM mg/dL 7.6* 8.2*   ALT (SGPT) U/L  --  23   AST (SGOT) U/L  --  34*   TROPONIN I ng/mL  --  0.203*     Estimated Creatinine Clearance: 42.1 mL/min (by C-G formula based on SCr of 0.78 mg/dL).  BNP   Date Value Ref Range Status   11/07/2018 807.0 (H) 0.0 - 100.0 pg/mL Final     Comment:     Results may be falsely decreased if patient taking Biotin.       Microbiology Results Abnormal     Procedure Component Value - Date/Time    Blood Culture - Blood, [[id number]]  (Normal) Collected:  11/07/18 1225    Lab Status:  Preliminary result Specimen:  Blood from Wrist, Right Updated:  11/09/18 1300     Blood Culture No growth at 2 days    Blood Culture - Blood, [[id number]]  (Normal) Collected:  11/07/18 1210    Lab Status:  Preliminary result Specimen:  Blood from Arm, Right Updated:  11/09/18 1300     Blood Culture No growth at 2 days    Respiratory Culture - Sputum, Cough [[id number]] Collected:  11/08/18 1007    Lab Status:  Preliminary result Specimen:  Sputum from Cough Updated:  11/09/18 0731     Respiratory Culture Light growth (2+) Normal Respiratory Roseann     Gram Stain Result Many (4+) WBCs per low power field      Moderate (3+) Epithelial cells per low power field      Moderate (3+) Gram positive cocci in pairs      Few (2+) Gram positive bacilli      Occasional Gram negative bacilli    Blood Culture - Blood, [[id number]]  (Normal) Collected:  11/07/18 1946    Lab Status:  Preliminary result Specimen:  Blood from Hand, Left Updated:  11/08/18 2000     Blood Culture No growth at 24 hours    Blood Culture - Blood, [[id number]]  (Normal) Collected:  11/07/18 1946    Lab Status:  Preliminary result Specimen:  Blood from Arm, Right Updated:  11/08/18 2000     Blood Culture No growth at 24 hours    Influenza A & B, RT PCR - Swab, Nasopharynx [[id number]]  (Normal) Collected:  11/07/18 1326    Lab Status:  Final result Specimen:  Swab from Nasopharynx  Updated:  11/07/18 1436     Influenza A PCR Not Detected     Influenza B PCR Not Detected    Influenza A & B, RT PCR - Swab, Nasopharynx [[id number]] Collected:  11/07/18 1239    Lab Status:  Edited Result - FINAL Specimen:  Swab from Nasopharynx Updated:  11/07/18 1304     Influenza A PCR --     Comment: Wrong swab submitted. Recollect requested.   Corrected result. Previous result was Not Detected on 11/7/2018 at 1259 EST.        Influenza B PCR --     Comment: Wrong swab submitted. Recollect requested.   Corrected result. Previous result was Not Detected on 11/7/2018 at 1259 EST.             Imaging Results (last 24 hours)     ** No results found for the last 24 hours. **        Results for orders placed during the hospital encounter of 11/07/18   Adult Transthoracic Echo Complete W/ Cont if Necessary Per Protocol    Narrative · Mild to moderate aortic valve regurgitation is present.  · Mild-to-moderate mitral valve regurgitation is present.  · Mild tricuspid valve regurgitation is present.  · Left ventricular wall thickness is consistent with mild concentric   hypertrophy.  · Left ventricular systolic function is normal. Estimated EF = 70%.  · There is no evidence of pericardial effusion.  · No evidence of pulmonary hypertension is present.  · The aortic valve exhibits sclerosis.  · Moderate MAC is present.  · Normal right ventricular cavity size, wall thickness, systolic function   and septal motion noted.          I have reviewed the medications.      aspirin 81 mg Oral Daily   atorvastatin 10 mg Oral Nightly   azithromycin 250 mg Intravenous Q24H   cholecalciferol 1,000 Units Oral Daily   donepezil 10 mg Oral Nightly   heparin (porcine) 5,000 Units Subcutaneous Q8H   levothyroxine 88 mcg Oral Once per day on Mon Tue Wed Thu Fri Sat   melatonin 2.5 mg Sublingual Nightly   multivitamin with minerals 1 tablet Oral Daily   piperacillin-tazobactam 3.375 g Intravenous Q8H   sodium chloride 3 mL Intravenous Q12H      Assessment/Plan   Assessment / Plan     Active Hospital Problems    Diagnosis   • **Possible Aspiration pneumonia (CMS/HCC), right lower lobe   • Accident due to mechanical fall without injury   • Hypotension   • New onset Atrial fibrillation with rapid ventricular response (CMS/HCC)   • Elevated troponin   • Acute respiratory failure with hypoxia (CMS/HCC)   • Late onset Alzheimer's disease without behavioral disturbance   • Community acquired pneumonia of right lower lobe of lung (CMS/HCC)     Brief Hospital Course to date:  Katharine Mendoza is a 87 y.o. female from a nursing home who presented after fall.    Assessment & Plan:    Fall  - fall precautions  Aspiration  - place NG tube today to protect airways  - she had respiratory event overnight and needed suctioning  Acute Hypoxic Respiratory  - appears to have aspiration  - NPO due to respiratory event overnight  Elevated Troponin  - no need to trend  - not a candidate for intervention  New Onset A Fib RVR  - high fall risk  - no anticoagulation  Dementia  - unclear how severe  Weakness    Modified diet  Up to chair  Wean oxygen as tolerated    DVT Prophylaxis:  Heparin SC    CODE STATUS:   Code Status and Medical Interventions:   Ordered at: 11/07/18 1258     Limited Support to NOT Include:    Intubation     Code Status:    CPR     Medical Interventions (Level of Support Prior to Arrest):    Limited     Comments:    Discussed with patient's son who wanted full code currently but plans to review her living will and bring it to the hospital     Disposition: I expect the patient to be discharged TBD    Electronically signed by Asim Bravo MD, 11/09/18, 4:49 PM.

## 2018-11-09 NOTE — PLAN OF CARE
Problem: Patient Care Overview  Goal: Plan of Care Review  Outcome: Ongoing (interventions implemented as appropriate)   11/09/18 0909   Coping/Psychosocial   Plan of Care Reviewed With patient   SLP treatment completed. Provided handout and demo of exercises to pt. Pt tolerating level II diet w/ HTL w/o overt s/sxs aspiration. Rec continue level II diet and HTL. Meds whole or crushed in puree. Follow standard aspiration precautions. SLP will continue to follow for tx. Please see note for further details and recommendations.

## 2018-11-09 NOTE — THERAPY EVALUATION
Acute Care - Physical Therapy Initial Evaluation  [facility]     Patient Name: Katharine Mendoza  : 1931  MRN: [id number]  Today's Date: 2018   Onset of Illness/Injury or Date of Surgery: 18  Date of Referral to PT: 18  Referring Physician: MD Shelly      Admit Date: 2018    Visit Dx:     ICD-10-CM ICD-9-CM   1. Community acquired pneumonia of right lower lobe of lung (CMS/HCC) J18.1 481   2. Hypoxemia R09.02 799.02   3. Acute respiratory failure with hypoxia (CMS/HCC) J96.01 518.81   4. Oropharyngeal dysphagia R13.12 787.22   5. Impaired mobility and ADLs Z74.09 799.89   6. Impaired functional mobility, balance, gait, and endurance Z74.09 V49.89     Patient Active Problem List   Diagnosis   • Possible Aspiration pneumonia (CMS/HCC), right lower lobe   • Accident due to mechanical fall without injury   • Hypotension   • New onset Atrial fibrillation with rapid ventricular response (CMS/HCC)   • Elevated troponin   • Acute respiratory failure with hypoxia (CMS/HCC)   • Late onset Alzheimer's disease without behavioral disturbance   • Community acquired pneumonia of right lower lobe of lung (CMS/HCC)     Past Medical History:   Diagnosis Date   • Anxiety    • Dementia    • Disease of thyroid gland    • Hyperlipidemia    • Osteoporosis      History reviewed. No pertinent surgical history.     PT ASSESSMENT (last 12 hours)      Physical Therapy Evaluation     Row Name 18 1040          PT Evaluation Time/Intention    Subjective Information complains of;weakness  -     Document Type evaluation  -     Mode of Treatment individual therapy;physical therapy  -     Patient Effort excellent  -     Symptoms Noted During/After Treatment shortness of breath  -     Row Name 18 1040          General Information    Patient Profile Reviewed? yes  -     Onset of Illness/Injury or Date of Surgery 18  -     Referring Physician MD Shelly  -     Patient Observations  alert;cooperative;agree to therapy  -     Patient/Family Observations Son present for session.  -     General Observations of Patient O2 via NC at 5 lpm, PIV intact. Supine with HOB elevated  -     Prior Level of Function independent:;all household mobility;gait;transfer;bed mobility;ADL's;feeding;grooming;dressing;bathing  -     Equipment Currently Used at Home shower chair;walker, rolling  -     Pertinent History of Current Functional Problem Pt is s/p fall at [facility], noted to have PNA and hypoxia on arrival.  -     Existing Precautions/Restrictions fall;oxygen therapy device and L/min  -     Risks Reviewed patient and family:;LOB;nausea/vomiting;dizziness;increased discomfort  -     Benefits Reviewed patient and family:;improve function;increase independence;increase strength;increase balance;decrease pain  -     Barriers to Rehab cognitive status  -     Row Name 11/09/18 1040          Relationship/Environment    Primary Source of Support/Comfort child(bharati)  -     Lives With alone;facility resident  -     Concerns About Impact on Relationships [facility]  -     Row Name 11/09/18 1040          Cognitive Assessment/Interventions    Additional Documentation Cognitive Assessment/Intervention (Group)  -     Row Name 11/09/18 1040          Cognitive Assessment/Intervention- PT/OT    Affect/Mental Status (Cognitive) confused   intermittent confusion  -     Orientation Status (Cognition) oriented to;person;disoriented to;place;time;situation  -     Follows Commands (Cognition) follows one step commands;over 90% accuracy;verbal cues/prompting required  -     Safety Deficit (Cognitive) moderate deficit;awareness of need for assistance;insight into deficits/self awareness;safety precautions awareness  -     Personal Safety Interventions fall prevention program maintained;gait belt;nonskid shoes/slippers when out of bed  -     Row Name 11/09/18 1040          Safety Issues, Functional Mobility     Impairments Affecting Function (Mobility) cognition;balance;strength;endurance/activity tolerance  -     Row Name 11/09/18 1040          Bed Mobility Assessment/Treatment    Bed Mobility Assessment/Treatment scooting/bridging;supine-sit  -     Scooting/Bridging [place] (Bed Mobility) minimum assist (75% patient effort)  -     Supine-Sit [place] (Bed Mobility) verbal cues;nonverbal cues (demo/gesture);minimum assist (75% patient effort)  -     Bed Mobility, Safety Issues decreased use of arms for pushing/pulling;decreased use of legs for bridging/pushing  -     Assistive Device (Bed Mobility) draw sheet;head of bed elevated  -     Comment (Bed Mobility) Cues for sequencing/energy conservation. Min A at drawsheet to complete t/f  -     Row Name 11/09/18 1040          Transfer Assessment/Treatment    Sit-Stand [place] (Transfers) verbal cues;minimum assist (75% patient effort)  -     Stand-Sit [place] (Transfers) verbal cues;contact guard  -     Row Name 11/09/18 1040          Sit-Stand Transfer    Assistive Device (Sit-Stand Transfers) walker, front-wheeled  -     Row Name 11/09/18 1040          Stand-Sit Transfer    Assistive Device (Stand-Sit Transfers) walker, front-wheeled  -     Row Name 11/09/18 1040          Gait/Stairs Assessment/Training    [place] Level (Gait) verbal cues;minimum assist (75% patient effort);2 person assist  -     Assistive Device (Gait) walker, front-wheeled  -     Distance in Feet (Gait) 60  -     Pattern (Gait) step-through  -     Deviations/Abnormal Patterns (Gait) base of support, narrow;brooke decreased;stride length decreased  -     Bilateral Gait Deviations forward flexed posture;heel strike decreased  -     Comment (Gait/Stairs) Cues for obstacle avoidance/navigation. No LOB. Cues for PLB with increased SOA  -     Row Name 11/09/18 1040          General ROM    GENERAL ROM COMMENTS BLE AROM WFL for age  -     Row Name  11/09/18 1040          MMT (Manual Muscle Testing)    General MMT Comments BLE grossly 4/5  -     Row Name 11/09/18 1040          Static Sitting Balance    Level of [place] (Unsupported Sitting, Static Balance) supervision  -     Sitting Position (Unsupported Sitting, Static Balance) sitting on edge of bed  -     Row Name 11/09/18 1040          Static Standing Balance    Level of [place] (Supported Standing, Static Balance) contact guard assist  -     Row Name 11/09/18 1040          Dynamic Standing Balance    Level of [place], Reaches Outside Midline (Standing, Dynamic Balance) contact guard assist  -     Row Name 11/09/18 1040          Sensory Assessment/Intervention    Sensory General Assessment no sensation deficits identified  -     Row Name 11/09/18 1040          Pain Scale: Numbers Pre/Post-Treatment    Pain Scale: Numbers, Pretreatment 0/10 - no pain  -     Pain Scale: Numbers, Post-Treatment 0/10 - no pain  -     Row Name 11/09/18 1040          Coping    Observed Emotional State accepting;calm;cooperative  -     Verbalized Emotional State acceptance  -     Row Name 11/09/18 1040          Physical Therapy Clinical Impression    Date of Referral to PT 11/08/18  -     PT Diagnosis (PT Clinical Impression) impaired functional mobility  -     Patient/Family Goals Statement (PT Clinical Impression) Pt did not state. Son states he'd like for her to be able to go back to the [facility]  -     Criteria for Skilled Interventions Met (PT Clinical Impression) yes;treatment indicated  -     Rehab Potential (PT Clinical Summary) good, to achieve stated therapy goals  -     Care Plan Review (PT) evaluation/treatment results reviewed;care plan/treatment goals reviewed;risks/benefits reviewed;current/potential barriers reviewed;patient/other agree to care plan  -     Care Plan Review, Other Participant (PT Clinical Impression) son  -     Row Name 11/09/18 1040          Vital Signs     Pre Systolic BP Rehab 135  -MC     Pre Treatment Diastolic BP 76  -MC     Pretreatment Heart Rate (beats/min) 75  -MC     Intratreatment Heart Rate (beats/min) 88  -MC     Posttreatment Heart Rate (beats/min) 86  -MC     Pre SpO2 (%) 96  -MC     O2 Delivery Pre Treatment supplemental O2  -MC     Intra SpO2 (%) 90   upon returning from ambulation  -MC     O2 Delivery Intra Treatment supplemental O2  -MC     Post SpO2 (%) 95  -MC     O2 Delivery Post Treatment supplemental O2  -MC     Pre Patient Position Supine  -     Intra Patient Position Standing  -MC     Post Patient Position Sitting  -     Row Name 11/09/18 1040          Physical Therapy Goals    Bed Mobility Goal Selection (PT) bed mobility, PT goal 1  -     Transfer Goal Selection (PT) transfer, PT goal 1  -     Gait Training Goal Selection (PT) gait training, PT goal 1  -     Row Name 11/09/18 1040          Bed Mobility Goal 1 (PT)    Activity/Assistive Device (Bed Mobility Goal 1, PT) sit to supine/supine to sit  -     [place] Level/Cues Needed (Bed Mobility Goal 1, PT) conditional independence  -     Time Frame (Bed Mobility Goal 1, PT) long term goal (LTG);10 days  -     Row Name 11/09/18 1040          Transfer Goal 1 (PT)    Activity/Assistive Device (Transfer Goal 1, PT) sit-to-stand/stand-to-sit;bed-to-chair/chair-to-bed;toilet  -     [place] Level/Cues Needed (Transfer Goal 1, PT) supervision required  -MC     Time Frame (Transfer Goal 1, PT) long term goal (LTG);10 days  -     Row Name 11/09/18 1040          Gait Training Goal 1 (PT)    Activity/Assistive Device (Gait Training Goal 1, PT) gait (walking locomotion);assistive device use  -     [place] Level (Gait Training Goal 1, PT) supervision required  -     Distance (Gait Goal 1, PT) 150  -     Time Frame (Gait Training Goal 1, PT) long term goal (LTG);10 days  -     Row Name 11/09/18 1040          Positioning and Restraints    Pre-Treatment Position in  bed  -MC     Post Treatment Position chair  -MC     In Chair reclined;call light within reach;encouraged to call for assist;exit alarm on;with family/caregiver  -       User Key  (r) = Recorded By, (t) = Taken By, (c) = Cosigned By    Initials Name Provider Type    Lona Ferrell PT Physical Therapist          Physical Therapy Education     Title: PT OT SLP Therapies (Active)     Topic: Physical Therapy (Active)     Point: Mobility training (Active)    Learning Progress Summary     Learner Status Readiness Method Response Comment Documented by    Patient Active Acceptance E NR   11/09/18 1040    Family Active Acceptance E NR   11/09/18 1040          Point: Home exercise program (Active)    Learning Progress Summary     Learner Status Readiness Method Response Comment Documented by    Patient Active Acceptance E NR   11/09/18 1040    Family Active Acceptance E NR   11/09/18 1040          Point: Body mechanics (Active)    Learning Progress Summary     Learner Status Readiness Method Response Comment Documented by    Patient Active Acceptance E NR   11/09/18 1040    Family Active Acceptance E NR   11/09/18 1040          Point: Precautions (Active)    Learning Progress Summary     Learner Status Readiness Method Response Comment Documented by    Patient Active Acceptance E NR   11/09/18 1040    Family Active Acceptance E NR   11/09/18 1040                      User Key     Initials Effective Dates Name Provider Type Discipline     04/03/18 -  Lona Mccarty PT Physical Therapist PT                PT Recommendation and Plan  Anticipated Discharge Disposition (PT): skilled nursing facility  Therapy Frequency (PT Clinical Impression): daily  Outcome Summary/Treatment Plan (PT)  Anticipated Discharge Disposition (PT): skilled nursing facility  Plan of Care Reviewed With: family, patient  Outcome Summary: Pt presents with functional deficits in mobility and strength. Pt required Min A for bed  mobility and CGA-Min A to ambulate 60 ft with RWx. D/t pt's evolving symptoms, will benefit from IPPT to address functional deficits. Anticipate return to [facility] with HHPT or d/c to SNF.          Outcome Measures     Row Name 11/09/18 1127 11/09/18 1040          How much help from another person do you currently need...    Turning from your back to your side while in flat bed without using bedrails?  -- 3  -MC     Moving from lying on back to sitting on the side of a flat bed without bedrails?  -- 3  -MC     Moving to and from a bed to a chair (including a wheelchair)?  -- 3  -MC     Standing up from a chair using your arms (e.g., wheelchair, bedside chair)?  -- 3  -MC     Climbing 3-5 steps with a railing?  -- 2  -MC     To walk in hospital room?  -- 3  -MC     AM-PAC 6 Clicks Score  -- 17  -        How much help from another is currently needed...    Putting on and taking off regular lower body clothing? 2  -KF  --     Bathing (including washing, rinsing, and drying) 2  -KF  --     Toileting (which includes using toilet bed pan or urinal) 2  -KF  --     Putting on and taking off regular upper body clothing 3  -KF  --     Taking care of personal grooming (such as brushing teeth) 3  -KF  --     Eating meals 3  -KF  --     Score 15  -KF  --        Functional Assessment    Outcome Measure Options AM-PAC 6 Clicks Daily Activity (OT)  -KF AM-[facility] 6 Clicks Basic Mobility (PT)  -       User Key  (r) = Recorded By, (t) = Taken By, (c) = Cosigned By    Initials Name Provider Type    Lona Ferrell, PT Physical Therapist    KF Regina Claire, OT Occupational Therapist           Time Calculation:         PT Charges     Row Name 11/09/18 1040             Time Calculation    Start Time 1040  -      PT Received On 11/09/18  -      PT Goal Re-Cert Due Date 11/19/18  -        User Key  (r) = Recorded By, (t) = Taken By, (c) = Cosigned By    Initials Name Provider Type    Lona Ferrell, PT Physical  Therapist        Therapy Suggested Charges     Code   Minutes Charges    None           Therapy Charges for Today     Code Description Service Date Service Provider Modifiers Qty    96940887067 HC PT EVAL MOD COMPLEXITY 4 11/9/2018 Lona Mccarty, PT GP 1    [id number]  PT THER SUPP EA 15 MIN 11/9/2018 Lona Mccarty, PT GP 2          PT G-Codes  Outcome Measure Options: AM-PAC 6 Clicks Daily Activity (OT)  AM-PAC 6 Clicks Score: 17  Score: 15      Lona Mccarty, PT  11/9/2018

## 2018-11-09 NOTE — PLAN OF CARE
Problem: Patient Care Overview  Goal: Individualization and Mutuality  Outcome: Ongoing (interventions implemented as appropriate)   11/09/18 3896   Individualization   Patient Specific Goals (Include Timeframe) patient receiving antibiotics. up to chair with assistance. confused at times pulling iv's out and tele box off        Problem: Fall Risk (Adult)  Goal: Absence of Fall  Outcome: Outcome(s) achieved Date Met: 11/09/18      Problem: Skin Injury Risk (Adult)  Goal: Skin Health and Integrity  Outcome: Ongoing (interventions implemented as appropriate)      Problem: Pneumonia (Adult)  Goal: Signs and Symptoms of Listed Potential Problems Will be Absent, Minimized or Managed (Pneumonia)  Outcome: Ongoing (interventions implemented as appropriate)

## 2018-11-09 NOTE — PLAN OF CARE
Problem: Patient Care Overview  Goal: Plan of Care Review  Outcome: Ongoing (interventions implemented as appropriate)   11/09/18 1040   Coping/Psychosocial   Plan of Care Reviewed With family;patient   OTHER   Outcome Summary Pt presents with functional deficits in mobility and strength. Pt required Min A for bed mobility and CGA-Min A to ambulate 60 ft with RWx. D/t pt's evolving symptoms, will benefit from IPPT to address functional deficits. Anticipate return to [facility] with HHPT or d/c to SNF.

## 2018-11-09 NOTE — THERAPY EVALUATION
Acute Care - Occupational Therapy Initial Evaluation  [facility]     Patient Name: Katharine Mendoza  : 1931  MRN: [id number]  Today's Date: 2018  Onset of Illness/Injury or Date of Surgery: 18  Date of Referral to OT: 18  Referring Physician: MD Shelly    Admit Date: 2018       ICD-10-CM ICD-9-CM   1. Community acquired pneumonia of right lower lobe of lung (CMS/HCC) J18.1 481   2. Hypoxemia R09.02 799.02   3. Acute respiratory failure with hypoxia (CMS/HCC) J96.01 518.81   4. Oropharyngeal dysphagia R13.12 787.22   5. Impaired mobility and ADLs Z74.09 799.89   6. Impaired functional mobility, balance, gait, and endurance Z74.09 V49.89     Patient Active Problem List   Diagnosis   • Possible Aspiration pneumonia (CMS/HCC), right lower lobe   • Accident due to mechanical fall without injury   • Hypotension   • New onset Atrial fibrillation with rapid ventricular response (CMS/HCC)   • Elevated troponin   • Acute respiratory failure with hypoxia (CMS/HCC)   • Late onset Alzheimer's disease without behavioral disturbance   • Community acquired pneumonia of right lower lobe of lung (CMS/HCC)     Past Medical History:   Diagnosis Date   • Anxiety    • Dementia    • Disease of thyroid gland    • Hyperlipidemia    • Osteoporosis      History reviewed. No pertinent surgical history.       OT ASSESSMENT FLOWSHEET (last 72 hours)      Occupational Therapy Evaluation     Row Name 18 1127                   OT Evaluation Time/Intention    Subjective Information complains of;weakness  -KF        Document Type evaluation  -KF        Mode of Treatment individual therapy;occupational therapy  -KF        Patient Effort excellent  -KF        Symptoms Noted During/After Treatment significant change in vital signs   O2 desat only to 88% with functional mobility to BS  -KF           General Information    Patient Profile Reviewed? yes  -KF        Onset of Illness/Injury or Date of Surgery  11/07/18  -KF        Referring Physician MD Shelly  -KF        Patient Observations alert;cooperative;agree to therapy  -KF        Patient/Family Observations son present throughout, IV intact throughout, Pt pleasantly confused, cued multiple times for IV and purpose of lines  -KF        General Observations of Patient UIC, NC 5L, agreeable for therapy  -KF        Prior Level of Function independent:;ADL's;transfer;gait;dependent:;cooking;driving  -KF        Equipment Currently Used at Home shower chair;walker, rolling  -KF        Pertinent History of Current Functional Problem Pt presents from [facility], poor historian, found PNA and hypoxic with new afib, recent fall at [facility]  -KF        Existing Precautions/Restrictions fall;oxygen therapy device and L/min  -KF        Limitations/Impairments safety/cognitive  -KF        Risks Reviewed patient and family:;LOB;change in vital signs;increased discomfort;nausea/vomiting;dizziness  -KF        Benefits Reviewed patient and family:;improve function;increase independence;increase strength;increase balance;decrease pain;increase knowledge  -KF        Barriers to Rehab cognitive status  -KF           Relationship/Environment    Primary Source of Support/Comfort child(bharati)  -KF        Lives With alone;facility resident  -KF        Concerns About Impact on Relationships lives at [facility]  -           Resource/Environmental Concerns    Current Living Arrangements independent/assisted living facility  -KF        Resource/Environmental Concerns none  -KF           Cognitive Assessment/Interventions    Additional Documentation Cognitive Assessment/Intervention (Group)  -KF           Cognitive Assessment/Intervention- PT/OT    Affect/Mental Status (Cognitive) confused  -KF        Orientation Status (Cognition) oriented to;person;disoriented to;place;time  -KF        Follows Commands (Cognition) follows one step commands;over 90% accuracy;verbal cues/prompting required;repetition of  directions required  -KF        Cognitive Function (Cognitive) memory deficit;safety deficit;attention deficit  -KF        Attention Deficit (Cognitive) distractible in noisy environment;mild deficit  -KF        Memory Deficit (Cognitive) severe deficit;short term memory;recall, recent events  -KF        Safety Deficit (Cognitive) moderate deficit;insight into deficits/self awareness;judgment;awareness of need for assistance;safety precautions awareness  -KF        Cognitive Interventions (Cognitive) occupation/activity based interventions;process/task specific training  -KF        Personal Safety Interventions fall prevention program maintained;gait belt;muscle strengthening facilitated;nonskid shoes/slippers when out of bed  -KF           Safety Issues, Functional Mobility    Safety Issues Affecting Function (Mobility) insight into deficits/self awareness;positioning of assistive device;judgment;awareness of need for assistance;safety precaution awareness;sequencing abilities  -KF        Impairments Affecting Function (Mobility) cognition;endurance/activity tolerance;balance;strength  -KF           Bed Mobility Assessment/Treatment    Comment (Bed Mobility) UIC  -KF           Functional Mobility    Functional Mobility- Ind. Level contact guard assist;verbal cues required  -KF        Functional Mobility- Device rolling walker  -KF        Functional Mobility-Distance (Feet) 16  -KF        Functional Mobility- Safety Issues balance decreased during turns;supplemental O2  -KF        Functional Mobility- Comment cued for sequencing and hand placement, cues required for line management and safety awareness  -KF           Transfer Assessment/Treatment    Transfer Assessment/Treatment sit-stand transfer;stand-sit transfer;toilet transfer  -KF        Comment (Transfers) cues for seq and HP  -KF           Sit-Stand Transfer    Sit-Stand [place] (Transfers) minimum assist (75% patient effort);verbal cues  -KF         Assistive Device (Sit-Stand Transfers) walker, front-wheeled  -KF           Stand-Sit Transfer    Stand-Sit [place] (Transfers) contact guard;verbal cues  -KF        Assistive Device (Stand-Sit Transfers) walker, front-wheeled  -KF           Toilet Transfer    Type (Toilet Transfer) stand pivot/stand step  -KF        [place] Level (Toilet Transfer) contact guard;verbal cues  -KF        Assistive Device (Toilet Transfer) commode, bedside without drop arms;walker, front-wheeled  -KF           ADL Assessment/Intervention    11690 - OT Self Care/Mgmt Minutes 15  -KF        BADL Assessment/Intervention toileting;lower body dressing;grooming  -KF           Lower Body Dressing Assessment/Training    Lower Body Dressing [place] Level don;undergarment;minimum assist (75% patient effort)  -KF        Lower Body Dressing Position unsupported sitting  -KF        Comment (Lower Body Dressing) required assist to start undergarment over socks, able to pull up but requires mod A once standing   -KF           Grooming Assessment/Training    [place] Level (Grooming) wash face, hands;supervision;verbal cues  -KF        Grooming Position supported sitting  -KF        Comment (Grooming) cues for initiation required, able to completed with set up  -KF           Toileting Assessment/Training    [place] Level (Toileting) adjust/manage clothing;moderate assist (50% patient effort);verbal cues;perform perineal hygiene;minimum assist (75% patient effort)  -KF        Assistive Devices (Toileting) commode, bedside without drop arms  -KF        Toileting Position supported sitting;supported standing  -KF        Comment (Toileting) min A for toilet hygiene completed most in sitting, mod A for clothing management of undergarment   -KF           BADL Safety/Performance    Impairments, BADL Safety/Performance cognition;balance;endurance/activity tolerance;strength  -KF        Cognitive Impairments, BADL Safety/Performance  attention;awareness, need for assistance;insight into deficits/self awareness;problem solving/reasoning;safety precaution awareness;sequencing abilities  -KF        Skilled BADL Treatment/Intervention BADL process/adaptation training;cognitive/safety deficit modifications  -KF           General ROM    GENERAL ROM COMMENTS BUE AROM WFL  -KF           MMT (Manual Muscle Testing)    General MMT Comments BUE grossly 3+/5  -KF           Motor Assessment/Interventions    Additional Documentation Balance (Group);Balance Interventions (Group);Fine Motor Testing & Training (Group)  -KF           Balance    Balance static sitting balance;static standing balance;dynamic sitting balance;dynamic standing balance  -KF           Static Sitting Balance    Level of [place] (Unsupported Sitting, Static Balance) supervision  -KF        Sitting Position (Unsupported Sitting, Static Balance) sitting in chair  -KF        Time Able to Maintain Position (Unsupported Sitting, Static Balance) more than 5 minutes  -KF           Dynamic Sitting Balance    Level of [place], Reaches Outside Midline (Sitting, Dynamic Balance) standby assist  -KF        Sitting Position, Reaches Outside Midline (Sitting, Dynamic Balance) sitting in chair  -KF        Comment, Reaches Outside Midline (Sitting, Dynamic Balance) with LB dressing tasks  -KF           Static Standing Balance    Level of [place] (Supported Standing, Static Balance) contact guard assist  -KF        Time Able to Maintain Position (Supported Standing, Static Balance) 1 to 2 minutes  -KF        Assistive Device Utilized (Supported Standing, Static Balance) rolling walker  -KF           Dynamic Standing Balance    Level of [place], Reaches Outside Midline (Standing, Dynamic Balance) contact guard assist  -KF        Time Able to Maintain Position, Reaches Outside Midline (Standing, Dynamic Balance) 1 to 2 minutes  -KF        Comment, Reaches Outside Midline (Standing,  Dynamic Balance) at RW, no LOB, able to participate in toileting tasks  -KF        Level of [place], Resists Mild Perturbations (Standing, Dynamic Balance) minimal assist, 75% patient effort  -KF        Time Able to Maintain Position, Resists Mild Perturbations (Standing, Dynamic Balance) 15 to 30 seconds  -KF        Comment, Resists Mild Perturbations (Sitting, Dynamic Balance) minimal LOB with perturbations during toileting tasks and LB dressing   -KF           Fine Motor Testing & Training    Training Activity, Fine Motor Coordination manipulation of grooming tools;other (see comments)   thumb opposition  -KF        Comment, Fine Motor Coordination WFL for ADLs (B)  -KF           Sensory Assessment/Intervention    Sensory General Assessment no sensation deficits identified  -KF           Positioning and Restraints    Pre-Treatment Position sitting in chair/recliner  -KF        Post Treatment Position chair  -KF        In Chair notified nsg;reclined;sitting;call light within reach;encouraged to call for assist;exit alarm on;with family/caregiver;waffle cushion;legs elevated  -KF           Pain Assessment    Additional Documentation Pain Scale: Numbers Pre/Post-Treatment (Group)  -KF           Pain Scale: Numbers Pre/Post-Treatment    Pain Scale: Numbers, Pretreatment 0/10 - no pain  -KF        Pain Scale: Numbers, Post-Treatment 0/10 - no pain  -KF        Pain Intervention(s) Repositioned;Ambulation/increased activity  -KF           Plan of Care Review    Plan of Care Reviewed With patient;family  -KF           Clinical Impression (OT)    Date of Referral to OT 11/09/18  -KF        OT Diagnosis ADL decline  -KF        Patient/Family Goals Statement (OT Eval) plof  -KF        Criteria for Skilled Therapeutic Interventions Met (OT Eval) yes;treatment indicated  -KF        Rehab Potential (OT Eval) good, to achieve stated therapy goals  -KF        Therapy Frequency (OT Eval) daily  -KF        Care Plan Review  (OT) evaluation/treatment results reviewed;care plan/treatment goals reviewed;risks/benefits reviewed;current/potential barriers reviewed;patient/other agree to care plan  -KF        Care Plan Review, Other Participant (OT Eval) son  -KF        Anticipated Equipment Needs at Discharge (OT) --   possible dressing AE  -KF        Anticipated Discharge Disposition (OT) home with 24/7 care;home with home health  -KF           Vital Signs    Pre Systolic BP Rehab 135  -KF        Pre Treatment Diastolic BP 76  -KF        Pretreatment Heart Rate (beats/min) 88  -KF        Posttreatment Heart Rate (beats/min) 85  -KF        Pre SpO2 (%) 95  -KF        O2 Delivery Pre Treatment supplemental O2  -KF        Intra SpO2 (%) 88  -KF        O2 Delivery Intra Treatment supplemental O2  -KF        Post SpO2 (%) 97  -KF        O2 Delivery Post Treatment supplemental O2  -KF        Pre Patient Position Sitting  -KF        Intra Patient Position Standing  -KF        Post Patient Position Sitting  -KF           Planned OT Interventions    Planned Therapy Interventions (OT Eval) activity tolerance training;adaptive equipment training;BADL retraining;cognitive/visual perception retraining;functional balance retraining;IADL retraining;neuromuscular control/coordination retraining;occupation/activity based interventions;patient/caregiver education/training;ROM/therapeutic exercise;strengthening exercise;transfer/mobility retraining  -KF           OT Goals    Transfer Goal Selection (OT) transfer, OT goal 1  -KF        Dressing Goal Selection (OT) dressing, OT goal 1  -KF        Toileting Goal Selection (OT) toileting, OT goal 1  -KF           Transfer Goal 1 (OT)    Activity/Assistive Device (Transfer Goal 1, OT) sit-to-stand/stand-to-sit;bed-to-chair/chair-to-bed;commode;walker, rolling  -KF        [place] Level/Cues Needed (Transfer Goal 1, OT) standby assist;verbal cues required  -KF        Time Frame (Transfer Goal 1, OT) long term  goal (LTG);by discharge  -KF        Progress/Outcome (Transfer Goal 1, OT) goal ongoing  -KF           Dressing Goal 1 (OT)    Activity/Assistive Device (Dressing Goal 1, OT) lower body dressing   AE prn  -KF        [place]/Cues Needed (Dressing Goal 1, OT) minimum assist (75% or more patient effort);verbal cues required  -KF        Time Frame (Dressing Goal 1, OT) long term goal (LTG);by discharge  -KF        Progress/Outcome (Dressing Goal 1, OT) goal ongoing  -KF           Toileting Goal 1 (OT)    Activity/Device (Toileting Goal 1, OT) adjust/manage clothing;perform perineal hygiene;commode, bedside without drop arms  -KF        [place] Level/Cues Needed (Toileting Goal 1, OT) contact guard assist;verbal cues required  -KF        Time Frame (Toileting Goal 1, OT) long term goal (LTG);by discharge  -KF        Progress/Outcome (Toileting Goal 1, OT) goal ongoing  -KF           Living Environment    Home Accessibility wheelchair accessible;other (see comments)   walk in shower with built in bench.shower chair  -KF          User Key  (r) = Recorded By, (t) = Taken By, (c) = Cosigned By    Initials Name Effective Dates    KF Regina Claire, OT 04/03/18 -            Occupational Therapy Education     Title: PT OT SLP Therapies (Active)     Topic: Occupational Therapy (Active)     Point: ADL training (Done)     Description: Instruct learner(s) on proper safety adaptation and remediation techniques during self care or transfers.   Instruct in proper use of assistive devices.   Learning Progress Summary     Learner Status Readiness Method Response Comment Documented by    Patient Done Acceptance E VU Purpose of skilled OT services, safe sequencing of functional transfers and mobility, LB dressing and toileting retraining  11/09/18 1127    Family Done Acceptance E VU Purpose of skilled OT services, safe sequencing of functional transfers and mobility, LB dressing and toileting retraining  11/09/18 1127           Point: Precautions (Done)     Description: Instruct learner(s) on prescribed precautions during self-care and functional transfers.   Learning Progress Summary     Learner Status Readiness Method Response Comment Documented by    Patient Done Acceptance E VU Purpose of skilled OT services, safe sequencing of functional transfers and mobility, LB dressing and toileting retraining  11/09/18 1127    Family Done Acceptance E VU Purpose of skilled OT services, safe sequencing of functional transfers and mobility, LB dressing and toileting retraining  11/09/18 1127          Point: Body mechanics (Done)     Description: Instruct learner(s) on proper positioning and spine alignment during self-care, functional mobility activities and/or exercises.   Learning Progress Summary     Learner Status Readiness Method Response Comment Documented by    Patient Done Acceptance E VU Purpose of skilled OT services, safe sequencing of functional transfers and mobility, LB dressing and toileting retraining  11/09/18 1127    Family Done Acceptance E VU Purpose of skilled OT services, safe sequencing of functional transfers and mobility, LB dressing and toileting retraining  11/09/18 1127                      User Key     Initials Effective Dates Name Provider Type Discipline     04/03/18 -  Regina Claire, OT Occupational Therapist OT                  OT Recommendation and Plan  Outcome Summary/Treatment Plan (OT)  Anticipated Equipment Needs at Discharge (OT):  (possible dressing AE)  Anticipated Discharge Disposition (OT): home with 24/7 care, home with home health  Planned Therapy Interventions (OT Eval): activity tolerance training, adaptive equipment training, BADL retraining, cognitive/visual perception retraining, functional balance retraining, IADL retraining, neuromuscular control/coordination retraining, occupation/activity based interventions, patient/caregiver education/training, ROM/therapeutic exercise,  strengthening exercise, transfer/mobility retraining  Therapy Frequency (OT Eval): daily  Plan of Care Review  Plan of Care Reviewed With: patient, family  Plan of Care Reviewed With: patient, family  Outcome Summary: OT eval completed Pt presents with deficits in strength, balance, and activity tolerance limiting ADL completion. min A for STS and CGA for functional mobility to [facility], mod A for clothing management and donning undergarment. Recom IPOT d/c MANDY with 24/7 supervision and HHOT          Outcome Measures     Row Name 11/09/18 1127             How much help from another is currently needed...    Putting on and taking off regular lower body clothing? 2  -KF      Bathing (including washing, rinsing, and drying) 2  -KF      Toileting (which includes using toilet bed pan or urinal) 2  -KF      Putting on and taking off regular upper body clothing 3  -KF      Taking care of personal grooming (such as brushing teeth) 3  -KF      Eating meals 3  -KF      Score 15  -KF         Functional Assessment    Outcome Measure Options AM-PAC 6 Clicks Daily Activity (OT)  -KF        User Key  (r) = Recorded By, (t) = Taken By, (c) = Cosigned By    Initials Name Provider Type    Regina Montana OT Occupational Therapist          Time Calculation:         Time Calculation- OT     Row Name 11/09/18 1127             Time Calculation- OT    OT Start Time 1127  -KF      Total Timed Code Minutes- OT 15 minute(s)  -KF      OT Received On 11/09/18  -KF      OT Goal Re-Cert Due Date 11/19/18  -KF         Timed Charges    65993 - OT Self Care/Mgmt Minutes 15  -KF        User Key  (r) = Recorded By, (t) = Taken By, (c) = Cosigned By    Initials Name Provider Type    Regina Montana OT Occupational Therapist        Therapy Suggested Charges     Code   Minutes Charges    92807 (CPT®) Hc Ot Neuromusc Re Education Ea 15 Min      44257 (CPT®) Hc Ot Ther Proc Ea 15 Min      27956 (CPT®) Hc Ot Therapeutic Act Ea 15 Min      85739  (CPT®) Hc Ot Manual Therapy Ea 15 Min      02874 (CPT®) Hc Ot Iontophoresis Ea 15 Min      43148 (CPT®) Hc Ot Elec Stim Ea-Per 15 Min      96286 (CPT®) Hc Ot Ultrasound Ea 15 Min      03411 (CPT®) Hc Ot Self Care/Mgmt/Train Ea 15 Min 15 1    Total  15 1        Therapy Charges for Today     Code Description Service Date Service Provider Modifiers Qty    77911351207 HC OT SELF CARE/MGMT/TRAIN EA 15 MIN 11/9/2018 Regina Claire, OT GO 1    41784640367 HC OT EVAL MOD COMPLEXITY 3 11/9/2018 Regina Claire OT GO 1               Regina Claire OT  11/9/2018

## 2018-11-09 NOTE — THERAPY TREATMENT NOTE
Acute Care - Speech Language Pathology   Swallow Treatment Note [facility]     Patient Name: Katharine Mendoza  : 1931  MRN: [id number]  Today's Date: 2018  Onset of Illness/Injury or Date of Surgery: 18     Referring Physician: MD Shelly      Admit Date: 2018    Visit Dx:      ICD-10-CM ICD-9-CM   1. Community acquired pneumonia of right lower lobe of lung (CMS/HCC) J18.1 481   2. Hypoxemia R09.02 799.02   3. Acute respiratory failure with hypoxia (CMS/HCC) J96.01 518.81   4. Oropharyngeal dysphagia R13.12 787.22   5. Impaired mobility and ADLs Z74.09 799.89   6. Impaired functional mobility, balance, gait, and endurance Z74.09 V49.89     Patient Active Problem List   Diagnosis   • Possible Aspiration pneumonia (CMS/HCC), right lower lobe   • Accident due to mechanical fall without injury   • Hypotension   • New onset Atrial fibrillation with rapid ventricular response (CMS/HCC)   • Elevated troponin   • Acute respiratory failure with hypoxia (CMS/HCC)   • Late onset Alzheimer's disease without behavioral disturbance   • Community acquired pneumonia of right lower lobe of lung (CMS/HCC)       Therapy Treatment        Rehabilitation Treatment Summary     Row Name 18 1415             Treatment Time/Intention    Discipline speech language pathologist  -MP      Document Type therapy note (daily note)  -MP      Subjective Information no complaints  -MP      Mode of Treatment individual therapy;speech-language pathology  -MP      Patient/Family Observations No family present  -MP      Therapy Frequency (Swallow) 5 days per week  -MP      Patient Effort good  -MP      Recorded by [MP] Lonnie Aguilar MS, PADMINI-SLP 18 1432      Row Name 18 1415             Pain Assessment    Additional Documentation Pain Scale: FACES Pre/Post-Treatment (Group)  -MP      Recorded by [MP] Lonnie Aguilar MS, PADMINI-SLP 18 1432      Row Name 18 1415             Pain Scale: FACES  Pre/Post-Treatment    Pain: FACES Scale, Pretreatment 2-->hurts little bit  -MP      Pain: FACES Scale, Post-Treatment 2-->hurts little bit  -MP      Recorded by [MP] Lonnie Aguilar MS, CFY-SLP 11/09/18 1432      Row Name 11/09/18 1415             Outcome Summary/Treatment Plan (SLP)    Daily Summary of Progress (SLP) progress toward functional goals as expected  -MP      Barriers to Overall Progress (SLP) Cognitive status   -MP      Plan for Continued Treatment (SLP) SLP will continue to follow for dysphagia tx  -MP      Anticipated Dischage Disposition unknown;anticipate therapy at next level of care  -MP      Recorded by [MP] Lonnie Aguilar MS, CFY-SLP 11/09/18 1432        User Key  (r) = Recorded By, (t) = Taken By, (c) = Cosigned By    Initials Name Effective Dates Discipline    MP Lonnie Aguilar MS, CFY-SLP 08/15/18 -  SLP          Outcome Summary  Outcome Summary/Treatment Plan (SLP)  Daily Summary of Progress (SLP): progress toward functional goals as expected (11/09/18 1415 : Lonnie Aguilar MS, CFY-SLP)  Barriers to Overall Progress (SLP): Cognitive status  (11/09/18 1415 : Lonnie Aguilar MS, CFY-SLP)  Plan for Continued Treatment (SLP): SLP will continue to follow for dysphagia tx (11/09/18 1415 : Lonnie Aguilar MS, CFY-SLP)  Anticipated Dischage Disposition: unknown, anticipate therapy at next level of care (11/09/18 1415 : Lonnie Aguilar MS, CFY-SLP)            SLP GOALS     Row Name 11/09/18 1415 11/08/18 1345          Oral Nutrition/Hydration Goal 1 (SLP)    Oral Nutrition/Hydration Goal 1, SLP LTG: Pt will return to soft whole foods and thin liquids w/ 100% accuracy w/o cues.   -MP LTG: Pt will return to soft whole foods and thin liquids w/ 100% accuracy w/o cues.   -RD     Time Frame (Oral Nutrition/Hydration Goal 1, SLP) by discharge  -MP by discharge  -RD     Progress/Outcomes (Oral Nutrition/Hydration Goal 1, SLP) continuing progress toward goal  -MP  --        Oral  Nutrition/Hydration Goal 2 (SLP)    Oral Nutrition/Hydration Goal 2, SLP Pt will tolerate small bites/sips of Honey-thick liquids and Level 2-pureed w/ no overt s/s of aspiration or complications w/ 100% accuracy w/o cues.   -MP Pt will tolerate small bites/sips of Honey-thick liquids and Level 2-pureed w/ no overt s/s of aspiration or complications w/ 100% accuracy w/o cues.   -RD     Time Frame (Oral Nutrition/Hydration Goal 2, SLP) short term goal (STG);by discharge  -MP short term goal (STG);by discharge  -RD     Barriers (Oral Nutrition/Hydration Goal 2, SLP) Pt tolerated trials of HTL and puree w/ no overt s/sxs aspiration  -MP  --     Progress/Outcomes (Oral Nutrition/Hydration Goal 2, SLP) good progress toward goal  -MP  --        Lingual Strengthening Goal 1 (SLP)    Activity (Lingual Strengthening Goal 1, SLP) increase tongue back strength  -MP increase tongue back strength  -RD     Increase Tongue Back Strength lingual resistance exercises  -MP lingual resistance exercises  -RD     [place]/Accuracy (Lingual Strengthening Goal 1, SLP) with minimal cues (75-90% accuracy)  -MP with minimal cues (75-90% accuracy)  -RD     Time Frame (Lingual Strengthening Goal 1, SLP) short term goal (STG);by discharge  -MP short term goal (STG);by discharge  -RD     Barriers (Lingual Strengthening Goal 1, SLP) Provided handout and demonstration of exercises. Completed 2 reps of each w/ SLP.  -MP  --     Progress/Outcomes (Lingual Strengthening Goal 1, SLP) continuing progress toward goal  -MP  --        Pharyngeal Strengthening Exercise Goal 1 (SLP)    Activity (Pharyngeal Strengthening Goal 1, SLP) increase timing;increase superior movement of the hyolaryngeal complex;increase closure at entrance to airway/closure of airway at glottis;increase squeeze/positive pressure generation;increase tongue base retraction  -MP increase timing;increase superior movement of the hyolaryngeal complex;increase closure at entrance to  airway/closure of airway at glottis;increase squeeze/positive pressure generation;increase tongue base retraction  -RD     Increase Timing prepping - 3 second prep or suck swallow or 3-step swallow  -MP prepping - 3 second prep or suck swallow or 3-step swallow  -RD     Increase Superior Movement of the Hyolaryngeal Complex supraglottic swallow  -MP supraglottic swallow  -RD     Increase Closure at Entrance to Airway/Closure of Airway at Glottis super-supraglottic swallow  -MP super-supraglottic swallow  -RD     Increase Squeeze/Positive Pressure Generation effortful pitch glide (falsetto + pharyngeal squeeze)  -MP effortful pitch glide (falsetto + pharyngeal squeeze)  -RD     Increase Tongue Base Retraction hard effortful swallow  -MP hard effortful swallow  -RD     [place]/Accuracy (Pharyngeal Strengthening Goal 1, SLP) with minimal cues (75-90% accuracy)  -MP with minimal cues (75-90% accuracy)  -RD     Time Frame (Pharyngeal Strengthening Goal 1, SLP) short term goal (STG);by discharge  -MP short term goal (STG);by discharge  -RD     Barriers (Pharyngeal Strengthening Goal 1, SLP) Provided handout and demonstration of exercises to pt. Completed two reps of each w/ SLP.  -MP  --     Progress/Outcomes (Pharyngeal Strengthening Goal 1, SLP) continuing progress toward goal  -MP  --        Swallow Management Recall Goal 1 (SLP)    Activity (Swallow Management Recall Goal 1, SLP) recall of;safe diet/liquid level;safe liquid viscosity;compensatory swallow strategies/techniques  -MP recall of;safe diet/liquid level;safe liquid viscosity;compensatory swallow strategies/techniques  -RD     [place]/Accuracy (Swallow Management Recall Goal 1, SLP) with minimal cues (75-90% accuracy)  -MP with minimal cues (75-90% accuracy)  -RD     Time Frame (Swallow Management Recall Goal 1, SLP) short term goal (STG);by discharge  -MP short term goal (STG);by discharge  -RD     Barriers (Swallow Management Recall Goal 1, SLP)  Pt confused and reported she had not been drinking thickened liquid. However, pt's lunch tray still in room w/ HTL on tray.  -MP  --     Progress/Outcomes (Swallow Management Recall Goal 1, SLP) continuing progress toward goal  -MP  --       User Key  (r) = Recorded By, (t) = Taken By, (c) = Cosigned By    Initials Name Provider Type    Marya Alcocer MS CCC-SLP Speech and Language Pathologist    Lonnie Cullen MS, CFY-SLP Speech and Language Pathologist          EDUCATION  The patient has been educated in the following areas:   Dysphagia (Swallowing Impairment) Modified Diet Instruction.    SLP Recommendation and Plan                       Anticipated Dischage Disposition: unknown, anticipate therapy at next level of care     Therapy Frequency (Swallow): 5 days per week          Plan of Care Reviewed With: patient  Plan of Care Review  Plan of Care Reviewed With: patient  Daily Summary of Progress (SLP): progress toward functional goals as expected  Plan for Continued Treatment (SLP): SLP will continue to follow for dysphagia tx           Time Calculation:         Time Calculation- SLP     Row Name 11/09/18 1441             Time Calculation- SLP    SLP Start Time 1415  -MP      SLP Received On 11/09/18  -MP        User Key  (r) = Recorded By, (t) = Taken By, (c) = Cosigned By    Initials Name Provider Type    Lonnie Cullen MS, CFY-SLP Speech and Language Pathologist          Therapy Charges for Today     Code Description Service Date Service Provider Modifiers Qty    83708008874  ST TREATMENT SWALLOW 3 11/9/2018 Lonnie Aguilar MS, CFDANELLE-SLP GN 1                 Lonnie Aguilar MS, PADMINI-SLP  11/9/2018

## 2018-11-10 LAB
BACTERIA SPEC RESP CULT: NORMAL
GRAM STN SPEC: NORMAL

## 2018-11-10 PROCEDURE — 25010000002 HEPARIN (PORCINE) PER 1000 UNITS: Performed by: HOSPITALIST

## 2018-11-10 PROCEDURE — 25010000002 AZITHROMYCIN PER 500 MG: Performed by: INTERNAL MEDICINE

## 2018-11-10 PROCEDURE — 99232 SBSQ HOSP IP/OBS MODERATE 35: CPT | Performed by: HOSPITALIST

## 2018-11-10 PROCEDURE — 25010000002 PIPERACILLIN SOD-TAZOBACTAM PER 1 G: Performed by: HOSPITALIST

## 2018-11-10 RX ADMIN — Medication 3 ML: at 21:08

## 2018-11-10 RX ADMIN — ATORVASTATIN CALCIUM 10 MG: 10 TABLET, FILM COATED ORAL at 21:07

## 2018-11-10 RX ADMIN — VITAMIN D, TAB 1000IU (100/BT) 1000 UNITS: 25 TAB at 08:30

## 2018-11-10 RX ADMIN — AZITHROMYCIN MONOHYDRATE 250 MG: 500 INJECTION, POWDER, LYOPHILIZED, FOR SOLUTION INTRAVENOUS at 15:31

## 2018-11-10 RX ADMIN — Medication 2.5 MG: at 21:07

## 2018-11-10 RX ADMIN — ASPIRIN 81 MG: 81 TABLET, COATED ORAL at 08:30

## 2018-11-10 RX ADMIN — HEPARIN SODIUM 5000 UNITS: 5000 INJECTION, SOLUTION INTRAVENOUS; SUBCUTANEOUS at 21:06

## 2018-11-10 RX ADMIN — HEPARIN SODIUM 5000 UNITS: 5000 INJECTION, SOLUTION INTRAVENOUS; SUBCUTANEOUS at 06:25

## 2018-11-10 RX ADMIN — TAZOBACTAM SODIUM AND PIPERACILLIN SODIUM 3.38 G: 375; 3 INJECTION, SOLUTION INTRAVENOUS at 17:14

## 2018-11-10 RX ADMIN — LEVOTHYROXINE SODIUM 88 MCG: 0.09 TABLET ORAL at 06:24

## 2018-11-10 RX ADMIN — HEPARIN SODIUM 5000 UNITS: 5000 INJECTION, SOLUTION INTRAVENOUS; SUBCUTANEOUS at 15:31

## 2018-11-10 RX ADMIN — Medication 3 ML: at 08:30

## 2018-11-10 RX ADMIN — TAZOBACTAM SODIUM AND PIPERACILLIN SODIUM 3.38 G: 375; 3 INJECTION, SOLUTION INTRAVENOUS at 01:10

## 2018-11-10 RX ADMIN — DONEPEZIL HYDROCHLORIDE 10 MG: 10 TABLET, FILM COATED ORAL at 21:20

## 2018-11-10 RX ADMIN — MULTIPLE VITAMINS W/ MINERALS TAB 1 TABLET: TAB ORAL at 08:30

## 2018-11-10 RX ADMIN — TAZOBACTAM SODIUM AND PIPERACILLIN SODIUM 3.38 G: 375; 3 INJECTION, SOLUTION INTRAVENOUS at 10:48

## 2018-11-10 NOTE — PROGRESS NOTES
[facility] Medicine Services  PROGRESS NOTE    Patient Name: Katharine Mendoza  : 1931  MRN: [id number]    Date of Admission: 2018  Length of Stay: 3  Primary Care Physician: Samia Carrington MD    Subjective   Subjective     CC:  Fall / hypoxia / respiratory distress    HPI:  Different son in room today.  Son today is Colby.  Only on 2 L/min today.  She denies chest pain or shortness of breath.  No evidence of distress today    Review of Systems    Gen- No fevers, chills  CV- No chest pain, palpitations  Resp- No cough, dyspnea  GI- No N/V/D, abd pain    Otherwise ROS is negative except as mentioned in the HPI.    Objective   Objective     Vital Signs:   Temp:  [97.8 °F (36.6 °C)-98.1 °F (36.7 °C)] 97.9 °F (36.6 °C)  Heart Rate:  [77-88] 77  Resp:  [16-17] 17  BP: (111-130)/(42-64) 124/64     Physical Exam:    Constitutional: No acute distress, awake, alert  HENT: NCAT, mucous membranes moist  Respiratory: poor inspiratory effort, rhonchi bilaterally, junky breath sounds of secretions in airways  Cardiovascular: RRR, s1 and s2  Gastrointestinal: Positive bowel sounds, soft, nontender, nondistended  Musculoskeletal: No bilateral ankle edema  Psychiatric: Appropriate affect, cooperative  Neurologic: Oriented x 3, strength symmetric in all extremities, Cranial Nerves grossly intact to confrontation, speech clear  Skin: No rashes    Results Reviewed:  I have personally reviewed current lab, radiology, and data and agree.    Results from last 7 days   Lab Units  18   03318   1854  18   1232   WBC 10*3/mm3   --   6.14   --   9.07   HEMOGLOBIN g/dL   --   10.6*   --   11.9   HEMATOCRIT %   --   33.1*   --   36.8   PLATELETS 10*3/mm3   --   145*   --   154   INR   1.09   --   1.09  1.08     Results from last 7 days   Lab Units  18   0338  18   1232   SODIUM mmol/L  136  132   POTASSIUM mmol/L  3.8  4.0   CHLORIDE mmol/L  103  99    CO2 mmol/L  25.0  25.0   BUN mg/dL  24*  23   CREATININE mg/dL  0.78  0.84   GLUCOSE mg/dL  97  114*   CALCIUM mg/dL  7.6*  8.2*   ALT (SGPT) U/L   --   23   AST (SGOT) U/L   --   34*   TROPONIN I ng/mL   --   0.203*     Estimated Creatinine Clearance: 42.5 mL/min (by C-G formula based on SCr of 0.78 mg/dL).  No results found for: BNP    Microbiology Results Abnormal     Procedure Component Value - Date/Time    Blood Culture - Blood, [[id number]] Collected:  11/07/18 1225    Lab Status:  Preliminary result Specimen:  Blood from Wrist, Right Updated:  11/10/18 1300     Blood Culture No growth at 3 days    Blood Culture - Blood, [[id number]] Collected:  11/07/18 1210    Lab Status:  Preliminary result Specimen:  Blood from Arm, Right Updated:  11/10/18 1300     Blood Culture No growth at 3 days    Respiratory Culture - Sputum, Cough [[id number]] Collected:  11/08/18 1007    Lab Status:  Final result Specimen:  Sputum from Cough Updated:  11/10/18 0654     Respiratory Culture Moderate growth (3+) Normal Respiratory Roseann     Gram Stain Many (4+) WBCs per low power field      Moderate (3+) Epithelial cells per low power field      Moderate (3+) Gram positive cocci in pairs      Few (2+) Gram positive bacilli      Occasional Gram negative bacilli    Blood Culture - Blood, [[id number]]  (Normal) Collected:  11/07/18 1946    Lab Status:  Preliminary result Specimen:  Blood from Hand, Left Updated:  11/09/18 2000     Blood Culture No growth at 2 days    Blood Culture - Blood, [[id number]]  (Normal) Collected:  11/07/18 1946    Lab Status:  Preliminary result Specimen:  Blood from Arm, Right Updated:  11/09/18 2000     Blood Culture No growth at 2 days    Influenza A & B, RT PCR - Swab, Nasopharynx [[id number]]  (Normal) Collected:  11/07/18 1326    Lab Status:  Final result Specimen:  Swab from Nasopharynx Updated:  11/07/18 1436     Influenza A PCR Not Detected     Influenza B PCR Not Detected    Influenza A & B, RT PCR -  Swab, Nasopharynx [[id number]] Collected:  11/07/18 1239    Lab Status:  Edited Result - FINAL Specimen:  Swab from Nasopharynx Updated:  11/07/18 1304     Influenza A PCR --     Comment: Wrong swab submitted. Recollect requested.   Corrected result. Previous result was Not Detected on 11/7/2018 at 1259 EST.        Influenza B PCR --     Comment: Wrong swab submitted. Recollect requested.   Corrected result. Previous result was Not Detected on 11/7/2018 at 1259 EST.             Imaging Results (last 24 hours)     ** No results found for the last 24 hours. **        Results for orders placed during the hospital encounter of 11/07/18   Adult Transthoracic Echo Complete W/ Cont if Necessary Per Protocol    Narrative · Mild to moderate aortic valve regurgitation is present.  · Mild-to-moderate mitral valve regurgitation is present.  · Mild tricuspid valve regurgitation is present.  · Left ventricular wall thickness is consistent with mild concentric   hypertrophy.  · Left ventricular systolic function is normal. Estimated EF = 70%.  · There is no evidence of pericardial effusion.  · No evidence of pulmonary hypertension is present.  · The aortic valve exhibits sclerosis.  · Moderate MAC is present.  · Normal right ventricular cavity size, wall thickness, systolic function   and septal motion noted.          I have reviewed the medications.      aspirin 81 mg Oral Daily   atorvastatin 10 mg Oral Nightly   azithromycin 250 mg Intravenous Q24H   cholecalciferol 1,000 Units Oral Daily   donepezil 10 mg Oral Nightly   heparin (porcine) 5,000 Units Subcutaneous Q8H   levothyroxine 88 mcg Oral Once per day on Mon Tue Wed Thu Fri Sat   melatonin 2.5 mg Sublingual Nightly   multivitamin with minerals 1 tablet Oral Daily   piperacillin-tazobactam 3.375 g Intravenous Q8H   sodium chloride 3 mL Intravenous Q12H     Assessment/Plan   Assessment / Plan     Active Hospital Problems    Diagnosis   • **Possible Aspiration pneumonia  (CMS/HCC), right lower lobe   • Accident due to mechanical fall without injury   • Hypotension   • New onset Atrial fibrillation with rapid ventricular response (CMS/HCC)   • Elevated troponin   • Acute respiratory failure with hypoxia (CMS/HCC)   • Late onset Alzheimer's disease without behavioral disturbance   • Community acquired pneumonia of right lower lobe of lung (CMS/HCC)     Brief Hospital Course to date:  Katharine Mendoza is a 87 y.o. female from a nursing home who presented after fall.    Assessment & Plan:    Fall  - fall precautions  Aspiration  - place NG tube today to protect airways  - she had respiratory event overnight and needed suctioning  Acute Hypoxic Respiratory  - appears to have aspiration  - no respiratory events reported in the last 24 hours  Elevated Troponin  - no need to trend  - not a candidate for intervention  New Onset A Fib RVR  - high fall risk  - no anticoagulation  Dementia  - unclear how severe  Weakness    Modified diet  Up to chair as tolerated  Wean oxygen as tolerated    Discussed case with son Colby today    DVT Prophylaxis:  Heparin SC    CODE STATUS:   Code Status and Medical Interventions:   Ordered at: 11/07/18 4788     Limited Support to NOT Include:    Intubation     Code Status:    CPR     Medical Interventions (Level of Support Prior to Arrest):    Limited     Comments:    Discussed with patient's son who wanted full code currently but plans to review her living will and bring it to the hospital     Disposition: I expect the patient to be discharged TBD    Electronically signed by Asim Bravo MD, 11/10/18, 4:17 PM.

## 2018-11-10 NOTE — PLAN OF CARE
Problem: Patient Care Overview  Goal: Plan of Care Review  Outcome: Ongoing (interventions implemented as appropriate)    Goal: Individualization and Mutuality  Outcome: Ongoing (interventions implemented as appropriate)   11/10/18 3601   Individualization   Patient Specific Goals (Include Timeframe) patient continues to receive antibiotics and fluids. up to chair. ambulated 50 feet in [place] .        Problem: Skin Injury Risk (Adult)  Goal: Skin Health and Integrity  Outcome: Ongoing (interventions implemented as appropriate)      Problem: Pneumonia (Adult)  Goal: Signs and Symptoms of Listed Potential Problems Will be Absent, Minimized or Managed (Pneumonia)  Outcome: Ongoing (interventions implemented as appropriate)

## 2018-11-11 LAB
BACTERIA UR QL AUTO: ABNORMAL /HPF
BILIRUB UR QL STRIP: NEGATIVE
CLARITY UR: ABNORMAL
COARSE GRAN CASTS URNS QL MICRO: ABNORMAL /LPF
COLOR UR: ABNORMAL
GLUCOSE UR STRIP-MCNC: NEGATIVE MG/DL
HGB UR QL STRIP.AUTO: ABNORMAL
KETONES UR QL STRIP: ABNORMAL
LEUKOCYTE ESTERASE UR QL STRIP.AUTO: ABNORMAL
NITRITE UR QL STRIP: NEGATIVE
PH UR STRIP.AUTO: <=5 [PH] (ref 5–8)
PROT UR QL STRIP: ABNORMAL
RBC # UR: ABNORMAL /HPF
REF LAB TEST METHOD: ABNORMAL
SP GR UR STRIP: 1.03 (ref 1–1.03)
SQUAMOUS #/AREA URNS HPF: ABNORMAL /HPF
UROBILINOGEN UR QL STRIP: ABNORMAL
WAXY CASTS #/AREA URNS LPF: ABNORMAL /LPF
WBC UR QL AUTO: ABNORMAL /HPF

## 2018-11-11 PROCEDURE — 81001 URINALYSIS AUTO W/SCOPE: CPT | Performed by: PHYSICIAN ASSISTANT

## 2018-11-11 PROCEDURE — 99233 SBSQ HOSP IP/OBS HIGH 50: CPT | Performed by: HOSPITALIST

## 2018-11-11 PROCEDURE — 25010000002 PIPERACILLIN SOD-TAZOBACTAM PER 1 G: Performed by: HOSPITALIST

## 2018-11-11 PROCEDURE — 94799 UNLISTED PULMONARY SVC/PX: CPT

## 2018-11-11 PROCEDURE — 25010000002 HEPARIN (PORCINE) PER 1000 UNITS: Performed by: HOSPITALIST

## 2018-11-11 RX ORDER — AMOXICILLIN AND CLAVULANATE POTASSIUM 875; 125 MG/1; MG/1
1 TABLET, FILM COATED ORAL EVERY 12 HOURS SCHEDULED
Status: DISCONTINUED | OUTPATIENT
Start: 2018-11-11 | End: 2018-11-13 | Stop reason: HOSPADM

## 2018-11-11 RX ADMIN — MULTIPLE VITAMINS W/ MINERALS TAB 1 TABLET: TAB ORAL at 08:48

## 2018-11-11 RX ADMIN — DONEPEZIL HYDROCHLORIDE 10 MG: 10 TABLET, FILM COATED ORAL at 21:27

## 2018-11-11 RX ADMIN — VITAMIN D, TAB 1000IU (100/BT) 1000 UNITS: 25 TAB at 08:49

## 2018-11-11 RX ADMIN — HEPARIN SODIUM 5000 UNITS: 5000 INJECTION, SOLUTION INTRAVENOUS; SUBCUTANEOUS at 14:52

## 2018-11-11 RX ADMIN — Medication 3 ML: at 08:49

## 2018-11-11 RX ADMIN — TAZOBACTAM SODIUM AND PIPERACILLIN SODIUM 3.38 G: 375; 3 INJECTION, SOLUTION INTRAVENOUS at 09:01

## 2018-11-11 RX ADMIN — AMOXICILLIN AND CLAVULANATE POTASSIUM 1 TABLET: 875; 125 TABLET, FILM COATED ORAL at 17:28

## 2018-11-11 RX ADMIN — Medication 2.5 MG: at 21:27

## 2018-11-11 RX ADMIN — ATORVASTATIN CALCIUM 10 MG: 10 TABLET, FILM COATED ORAL at 21:27

## 2018-11-11 RX ADMIN — HEPARIN SODIUM 5000 UNITS: 5000 INJECTION, SOLUTION INTRAVENOUS; SUBCUTANEOUS at 21:28

## 2018-11-11 RX ADMIN — HEPARIN SODIUM 5000 UNITS: 5000 INJECTION, SOLUTION INTRAVENOUS; SUBCUTANEOUS at 06:47

## 2018-11-11 RX ADMIN — ASPIRIN 81 MG: 81 TABLET, COATED ORAL at 08:49

## 2018-11-11 RX ADMIN — TAZOBACTAM SODIUM AND PIPERACILLIN SODIUM 3.38 G: 375; 3 INJECTION, SOLUTION INTRAVENOUS at 03:47

## 2018-11-11 RX ADMIN — Medication 3 ML: at 21:29

## 2018-11-11 NOTE — PROGRESS NOTES
[facility] Medicine Services  PROGRESS NOTE    Patient Name: Katharine Mendoza  : 1931  MRN: [id number]    Date of Admission: 2018  Length of Stay: 4  Primary Care Physician: Samia Carrington MD    Subjective   Subjective     CC:  Fall / hypoxia / respiratory distress    HPI:  Son in room today.  Seems to be breathing better.   Bedside spirometer revealing hypoventilation somewhere on the order of 30% of expected.      Review of Systems    Gen- No fevers, chills  CV- No chest pain, palpitations  Resp- No cough, dyspnea  GI- No N/V/D, abd pain    Otherwise ROS is negative except as mentioned in the HPI.    Objective   Objective     Vital Signs:   Temp:  [97.9 °F (36.6 °C)] 97.9 °F (36.6 °C)  Heart Rate:  [] 56  Resp:  [15-18] 15  BP: ()/(43-64) 93/43     Physical Exam:    Constitutional: No acute distress, awake, alert  HENT: NCAT, mucous membranes moist  Respiratory: poor inspiratory effort, rhonchi bilaterally, junky breath sounds of secretions in airways  Cardiovascular: RRR, s1 and s2  Gastrointestinal: Positive bowel sounds, soft, nontender, nondistended  Musculoskeletal: No bilateral ankle edema  Psychiatric: Appropriate affect, cooperative  Neurologic: Oriented x 3, strength symmetric in all extremities, Cranial Nerves grossly intact to confrontation, speech clear  Skin: No rashes    Results Reviewed:  I have personally reviewed current lab, radiology, and data and agree.    Results from last 7 days   Lab Units  18   03318   1854  18   1232   WBC 10*3/mm3   --   6.14   --   9.07   HEMOGLOBIN g/dL   --   10.6*   --   11.9   HEMATOCRIT %   --   33.1*   --   36.8   PLATELETS 10*3/mm3   --   145*   --   154   INR   1.09   --   1.09  1.08     Results from last 7 days   Lab Units  18   0338  18   1232   SODIUM mmol/L  136  132   POTASSIUM mmol/L  3.8  4.0   CHLORIDE mmol/L  103  99   CO2 mmol/L  25.0  25.0   BUN  mg/dL  24*  23   CREATININE mg/dL  0.78  0.84   GLUCOSE mg/dL  97  114*   CALCIUM mg/dL  7.6*  8.2*   ALT (SGPT) U/L   --   23   AST (SGOT) U/L   --   34*   TROPONIN I ng/mL   --   0.203*     Estimated Creatinine Clearance: 42.5 mL/min (by C-G formula based on SCr of 0.78 mg/dL).  No results found for: BNP    Microbiology Results Abnormal     Procedure Component Value - Date/Time    Blood Culture - Blood, [[id number]] Collected:  11/07/18 1225    Lab Status:  Preliminary result Specimen:  Blood from Wrist, Right Updated:  11/11/18 1300     Blood Culture No growth at 4 days    Blood Culture - Blood, [[id number]] Collected:  11/07/18 1210    Lab Status:  Preliminary result Specimen:  Blood from Arm, Right Updated:  11/11/18 1300     Blood Culture No growth at 4 days    Blood Culture - Blood, [[id number]] Collected:  11/07/18 1946    Lab Status:  Preliminary result Specimen:  Blood from Hand, Left Updated:  11/10/18 2000     Blood Culture No growth at 3 days    Blood Culture - Blood, [[id number]] Collected:  11/07/18 1946    Lab Status:  Preliminary result Specimen:  Blood from Arm, Right Updated:  11/10/18 2000     Blood Culture No growth at 3 days    Respiratory Culture - Sputum, Cough [[id number]] Collected:  11/08/18 1007    Lab Status:  Final result Specimen:  Sputum from Cough Updated:  11/10/18 0654     Respiratory Culture Moderate growth (3+) Normal Respiratory Roseann     Gram Stain Many (4+) WBCs per low power field      Moderate (3+) Epithelial cells per low power field      Moderate (3+) Gram positive cocci in pairs      Few (2+) Gram positive bacilli      Occasional Gram negative bacilli    Influenza A & B, RT PCR - Swab, Nasopharynx [[id number]]  (Normal) Collected:  11/07/18 1326    Lab Status:  Final result Specimen:  Swab from Nasopharynx Updated:  11/07/18 1436     Influenza A PCR Not Detected     Influenza B PCR Not Detected    Influenza A & B, RT PCR - Swab, Nasopharynx [[id number]] Collected:  11/07/18 1239     Lab Status:  Edited Result - FINAL Specimen:  Swab from Nasopharynx Updated:  11/07/18 1304     Influenza A PCR --     Comment: Wrong swab submitted. Recollect requested.   Corrected result. Previous result was Not Detected on 11/7/2018 at 1259 EST.        Influenza B PCR --     Comment: Wrong swab submitted. Recollect requested.   Corrected result. Previous result was Not Detected on 11/7/2018 at 1259 EST.             Imaging Results (last 24 hours)     ** No results found for the last 24 hours. **        Results for orders placed during the hospital encounter of 11/07/18   Adult Transthoracic Echo Complete W/ Cont if Necessary Per Protocol    Narrative · Mild to moderate aortic valve regurgitation is present.  · Mild-to-moderate mitral valve regurgitation is present.  · Mild tricuspid valve regurgitation is present.  · Left ventricular wall thickness is consistent with mild concentric   hypertrophy.  · Left ventricular systolic function is normal. Estimated EF = 70%.  · There is no evidence of pericardial effusion.  · No evidence of pulmonary hypertension is present.  · The aortic valve exhibits sclerosis.  · Moderate MAC is present.  · Normal right ventricular cavity size, wall thickness, systolic function   and septal motion noted.          I have reviewed the medications.      amoxicillin-clavulanate 1 tablet Oral Q12H   aspirin 81 mg Oral Daily   atorvastatin 10 mg Oral Nightly   cholecalciferol 1,000 Units Oral Daily   donepezil 10 mg Oral Nightly   heparin (porcine) 5,000 Units Subcutaneous Q8H   levothyroxine 88 mcg Oral Once per day on Mon Tue Wed Thu Fri Sat   melatonin 2.5 mg Sublingual Nightly   multivitamin with minerals 1 tablet Oral Daily   sodium chloride 3 mL Intravenous Q12H     Assessment/Plan   Assessment / Plan     Active Hospital Problems    Diagnosis   • **Possible Aspiration pneumonia (CMS/HCC), right lower lobe   • Accident due to mechanical fall without injury   • Hypotension   • New  onset Atrial fibrillation with rapid ventricular response (CMS/HCC)   • Elevated troponin   • Acute respiratory failure with hypoxia (CMS/HCC)   • Late onset Alzheimer's disease without behavioral disturbance   • Community acquired pneumonia of right lower lobe of lung (CMS/HCC)     Brief Hospital Course to date:  Katharine Mendoza is a 87 y.o. female from a nursing home who presented after fall.    Assessment & Plan:    Fall  - fall precautions  Aspiration  - place NG tube today to protect airways  - she had respiratory event overnight and needed suctioning  Acute Hypoxic Respiratory  - appears to have aspiration  - no respiratory events reported in the last 24 hours  Elevated Troponin  - no need to trend  - not a candidate for intervention  New Onset A Fib RVR  - high fall risk  - no anticoagulation  Dementia  - unclear how severe  Weakness  Hypoventilation    Modified diet  Wean oxygen as tolerated  Incentive Spirometer Exercises    Discussed case with son today in room    DVT Prophylaxis:  Heparin SC    CODE STATUS:   Code Status and Medical Interventions:   Ordered at: 11/07/18 3513     Limited Support to NOT Include:    Intubation     Code Status:    CPR     Medical Interventions (Level of Support Prior to Arrest):    Limited     Comments:    Discussed with patient's son who wanted full code currently but plans to review her living will and bring it to the hospital     Disposition: I expect the patient to be discharged to assisted living on Monday or Tuesday    Electronically signed by Asim Bravo MD, 11/11/18, 1:58 PM.

## 2018-11-11 NOTE — NURSING NOTE
WOC consult for MASD to periarea due to frequent incontinent stools.  Per RN Jackelyn pt now able to notify nurse when she needs to go to stool. Currently pt in chair, with white dry flow pad and z guard.  Continue current POC.

## 2018-11-11 NOTE — PLAN OF CARE
Problem: Patient Care Overview  Goal: Plan of Care Review  Outcome: Ongoing (interventions implemented as appropriate)   11/11/18 4014   OTHER   Outcome Summary Pt more alert today but remains confused. Continues to pick at lines and tele. Unable to wean O2 today d/t dropping into low 80's on RA. IS to 750. Change to oral Abx. Speech to re-eval in AM with MBS.

## 2018-11-11 NOTE — SIGNIFICANT NOTE
11/11/18 1500   SLP Deferred Reason   SLP Deferred Reason (Received a new order due to increased alertness/possible D/C.  Due to hx of silent aspiration, MBS warranted prior to diet increase.  Spoke with RN.  MBS tomorrow.)   SLP discussed status with RN. Will continue to address dysphagia with MBS tomorrow- prior to discharge due to MD report of increased alertness/possible discharge. Please see note for further details and recommendations.

## 2018-11-12 ENCOUNTER — APPOINTMENT (OUTPATIENT)
Dept: GENERAL RADIOLOGY | Facility: HOSPITAL | Age: 83
End: 2018-11-12

## 2018-11-12 LAB
BACTERIA SPEC AEROBE CULT: NORMAL

## 2018-11-12 PROCEDURE — 97116 GAIT TRAINING THERAPY: CPT

## 2018-11-12 PROCEDURE — 99232 SBSQ HOSP IP/OBS MODERATE 35: CPT | Performed by: HOSPITALIST

## 2018-11-12 PROCEDURE — 97110 THERAPEUTIC EXERCISES: CPT

## 2018-11-12 PROCEDURE — 25010000002 HEPARIN (PORCINE) PER 1000 UNITS: Performed by: HOSPITALIST

## 2018-11-12 PROCEDURE — 92611 MOTION FLUOROSCOPY/SWALLOW: CPT

## 2018-11-12 PROCEDURE — 94799 UNLISTED PULMONARY SVC/PX: CPT

## 2018-11-12 PROCEDURE — 74230 X-RAY XM SWLNG FUNCJ C+: CPT

## 2018-11-12 RX ADMIN — HEPARIN SODIUM 5000 UNITS: 5000 INJECTION, SOLUTION INTRAVENOUS; SUBCUTANEOUS at 05:29

## 2018-11-12 RX ADMIN — DIAZEPAM 2 MG: 2 TABLET ORAL at 21:17

## 2018-11-12 RX ADMIN — HEPARIN SODIUM 5000 UNITS: 5000 INJECTION, SOLUTION INTRAVENOUS; SUBCUTANEOUS at 21:18

## 2018-11-12 RX ADMIN — LEVOTHYROXINE SODIUM 88 MCG: 0.09 TABLET ORAL at 05:30

## 2018-11-12 RX ADMIN — DONEPEZIL HYDROCHLORIDE 10 MG: 10 TABLET, FILM COATED ORAL at 21:17

## 2018-11-12 RX ADMIN — VITAMIN D, TAB 1000IU (100/BT) 1000 UNITS: 25 TAB at 08:32

## 2018-11-12 RX ADMIN — ATORVASTATIN CALCIUM 10 MG: 10 TABLET, FILM COATED ORAL at 21:17

## 2018-11-12 RX ADMIN — AMOXICILLIN AND CLAVULANATE POTASSIUM 1 TABLET: 875; 125 TABLET, FILM COATED ORAL at 08:32

## 2018-11-12 RX ADMIN — Medication 2.5 MG: at 21:17

## 2018-11-12 RX ADMIN — BARIUM SULFATE 20 ML: 400 PASTE ORAL at 10:29

## 2018-11-12 RX ADMIN — HEPARIN SODIUM 5000 UNITS: 5000 INJECTION, SOLUTION INTRAVENOUS; SUBCUTANEOUS at 14:46

## 2018-11-12 RX ADMIN — MULTIPLE VITAMINS W/ MINERALS TAB 1 TABLET: TAB ORAL at 08:32

## 2018-11-12 RX ADMIN — BARIUM SULFATE 100 ML: 0.81 POWDER, FOR SUSPENSION ORAL at 10:29

## 2018-11-12 RX ADMIN — AMOXICILLIN AND CLAVULANATE POTASSIUM 1 TABLET: 875; 125 TABLET, FILM COATED ORAL at 21:18

## 2018-11-12 RX ADMIN — ASPIRIN 81 MG: 81 TABLET, COATED ORAL at 08:32

## 2018-11-12 RX ADMIN — Medication 3 ML: at 21:18

## 2018-11-12 NOTE — PLAN OF CARE
Problem: Patient Care Overview  Goal: Plan of Care Review  Outcome: Ongoing (interventions implemented as appropriate)   11/12/18 1143   Coping/Psychosocial   Plan of Care Reviewed With patient   SLP MBS re-evaluation completed. Pt presents w/ mild-moderate oropharyngeal dysphagia. Aspiration observed before the swallow w/ thin liquid via straw. Aspiration was eliminated w/ small, single sips of thin liquid via cup. No penetration/aspiration w/ pudding or solid. Recommend soft, whole diet w/ thin liquids via small, single cup sips. NO straws. Meds whole or crushed in puree. Follow standard aspiration precautions. SLP will f/u for family/pt education and tx. Please see note for further details and recommendations.

## 2018-11-12 NOTE — PROGRESS NOTES
[facility] Medicine Services  PROGRESS NOTE    Patient Name: Katharine Mendoza  : 1931  MRN: [id number]    Date of Admission: 2018  Length of Stay: 5  Primary Care Physician: Samia Carrington MD    Subjective   Subjective     CC:  Fall / hypoxia / respiratory distress    HPI:  Sitting up in chair today.  Off oxygen.  Looks the best she has looked since admission.  Discussed discharge back to assisted living today with patient and son.  Son in room today.  Afebrile.    Review of Systems    Gen- No fevers, chills  CV- No chest pain, palpitations  Resp- No cough, dyspnea  GI- No N/V/D, abd pain    Otherwise ROS is negative except as mentioned in the HPI.    Objective   Objective     Vital Signs:   Temp:  [97.7 °F (36.5 °C)-98 °F (36.7 °C)] 97.7 °F (36.5 °C)  Heart Rate:  [51-61] 51  Resp:  [15-18] 16  BP: (133-143)/(54-60) 143/54     Physical Exam:    Constitutional: No acute distress, awake, alert  HENT: NCAT, mucous membranes moist  Respiratory: poor inspiratory effort, rhonchi bilaterally, junky breath sounds of secretions in airways  Cardiovascular: RRR, s1 and s2  Gastrointestinal: Positive bowel sounds, soft, nontender, nondistended  Musculoskeletal: No bilateral ankle edema  Psychiatric: Appropriate affect, cooperative  Neurologic: Oriented x 3, strength symmetric in all extremities, Cranial Nerves grossly intact to confrontation, speech clear  Skin: No rashes    Results Reviewed:  I have personally reviewed current lab, radiology, and data and agree.    Results from last 7 days   Lab Units  18   03318   1854  18   1232   WBC 10*3/mm3   --   6.14   --   9.07   HEMOGLOBIN g/dL   --   10.6*   --   11.9   HEMATOCRIT %   --   33.1*   --   36.8   PLATELETS 10*3/mm3   --   145*   --   154   INR   1.09   --   1.09  1.08     Results from last 7 days   Lab Units  18   0338  18   1232   SODIUM mmol/L  136  132   POTASSIUM mmol/L   3.8  4.0   CHLORIDE mmol/L  103  99   CO2 mmol/L  25.0  25.0   BUN mg/dL  24*  23   CREATININE mg/dL  0.78  0.84   GLUCOSE mg/dL  97  114*   CALCIUM mg/dL  7.6*  8.2*   ALT (SGPT) U/L   --   23   AST (SGOT) U/L   --   34*   TROPONIN I ng/mL   --   0.203*     Estimated Creatinine Clearance: 42.1 mL/min (by C-G formula based on SCr of 0.78 mg/dL).  No results found for: BNP    Microbiology Results Abnormal     Procedure Component Value - Date/Time    Blood Culture - Blood, [[id number]] Collected:  11/07/18 1946    Lab Status:  Preliminary result Specimen:  Blood from Hand, Left Updated:  11/11/18 2000     Blood Culture No growth at 4 days    Blood Culture - Blood, [[id number]] Collected:  11/07/18 1946    Lab Status:  Preliminary result Specimen:  Blood from Arm, Right Updated:  11/11/18 2000     Blood Culture No growth at 4 days    Blood Culture - Blood, [[id number]] Collected:  11/07/18 1225    Lab Status:  Preliminary result Specimen:  Blood from Wrist, Right Updated:  11/11/18 1300     Blood Culture No growth at 4 days    Blood Culture - Blood, [[id number]] Collected:  11/07/18 1210    Lab Status:  Preliminary result Specimen:  Blood from Arm, Right Updated:  11/11/18 1300     Blood Culture No growth at 4 days    Respiratory Culture - Sputum, Cough [[id number]] Collected:  11/08/18 1007    Lab Status:  Final result Specimen:  Sputum from Cough Updated:  11/10/18 0654     Respiratory Culture Moderate growth (3+) Normal Respiratory Roseann     Gram Stain Many (4+) WBCs per low power field      Moderate (3+) Epithelial cells per low power field      Moderate (3+) Gram positive cocci in pairs      Few (2+) Gram positive bacilli      Occasional Gram negative bacilli    Influenza A & B, RT PCR - Swab, Nasopharynx [[id number]]  (Normal) Collected:  11/07/18 1326    Lab Status:  Final result Specimen:  Swab from Nasopharynx Updated:  11/07/18 1436     Influenza A PCR Not Detected     Influenza B PCR Not Detected    Influenza A &  B, RT PCR - Swab, Nasopharynx [[id number]] Collected:  11/07/18 1239    Lab Status:  Edited Result - FINAL Specimen:  Swab from Nasopharynx Updated:  11/07/18 1304     Influenza A PCR --     Comment: Wrong swab submitted. Recollect requested.   Corrected result. Previous result was Not Detected on 11/7/2018 at 1259 EST.        Influenza B PCR --     Comment: Wrong swab submitted. Recollect requested.   Corrected result. Previous result was Not Detected on 11/7/2018 at 1259 EST.             Imaging Results (last 24 hours)     Procedure Component Value Units Date/Time    FL Video Swallow With Speech [[id number]] Updated:  11/12/18 1030        Results for orders placed during the hospital encounter of 11/07/18   Adult Transthoracic Echo Complete W/ Cont if Necessary Per Protocol    Narrative · Mild to moderate aortic valve regurgitation is present.  · Mild-to-moderate mitral valve regurgitation is present.  · Mild tricuspid valve regurgitation is present.  · Left ventricular wall thickness is consistent with mild concentric   hypertrophy.  · Left ventricular systolic function is normal. Estimated EF = 70%.  · There is no evidence of pericardial effusion.  · No evidence of pulmonary hypertension is present.  · The aortic valve exhibits sclerosis.  · Moderate MAC is present.  · Normal right ventricular cavity size, wall thickness, systolic function   and septal motion noted.          I have reviewed the medications.      amoxicillin-clavulanate 1 tablet Oral Q12H   aspirin 81 mg Oral Daily   atorvastatin 10 mg Oral Nightly   cholecalciferol 1,000 Units Oral Daily   donepezil 10 mg Oral Nightly   heparin (porcine) 5,000 Units Subcutaneous Q8H   levothyroxine 88 mcg Oral Once per day on Mon Tue Wed Thu Fri Sat   melatonin 2.5 mg Sublingual Nightly   multivitamin with minerals 1 tablet Oral Daily   sodium chloride 3 mL Intravenous Q12H     Assessment/Plan   Assessment / Plan     Active Hospital Problems    Diagnosis   •  **Possible Aspiration pneumonia (CMS/HCC), right lower lobe   • Accident due to mechanical fall without injury   • Hypotension   • New onset Atrial fibrillation with rapid ventricular response (CMS/HCC)   • Elevated troponin   • Acute respiratory failure with hypoxia (CMS/HCC)   • Late onset Alzheimer's disease without behavioral disturbance   • Community acquired pneumonia of right lower lobe of lung (CMS/HCC)     Brief Hospital Course to date:  Katharine Mendoza is a 87 y.o. female from a nursing home who presented after fall.    Assessment & Plan:    Fall  - fall precautions  Aspiration  - place NG tube today to protect airways  - she had respiratory event overnight after admission and needed suctioning  Acute Hypoxic Respiratory  - appears to have aspiration  - no respiratory events reported in the last 24 hours  Elevated Troponin  - no need to trend  - not a candidate for intervention  New Onset A Fib RVR  - high fall risk  - no anticoagulation  Dementia  - unclear how severe  Weakness  Hypoventilation  - encouraged incentive spirometry    Advanced diet today  Appears to be doing well off oxygen today  Incentive Spirometer Exercises    Discussed case with son today in room  Discussed case today on Multidisciplinary Rounds today and discussed again with CM    DVT Prophylaxis:  Heparin SC    CODE STATUS:   Code Status and Medical Interventions:   Ordered at: 11/07/18 3913     Limited Support to NOT Include:    Intubation     Code Status:    CPR     Medical Interventions (Level of Support Prior to Arrest):    Limited     Comments:    Discussed with patient's son who wanted full code currently but plans to review her living will and bring it to the hospital     Disposition: I expect the patient to be discharged to assisted living on Tuesday    Electronically signed by Asim Bravo MD, 11/12/18, 12:27 PM.

## 2018-11-12 NOTE — PLAN OF CARE
Problem: Patient Care Overview  Goal: Plan of Care Review  Outcome: Ongoing (interventions implemented as appropriate)   11/12/18 0958   Coping/Psychosocial   Plan of Care Reviewed With patient   Plan of Care Review   Progress no change   OTHER   Outcome Summary Patient consult for MASD to fabricio-rectal area-area presents as stated-white/very moist/excoriated-recommend barrier spray then Z guard over affected area-dry flow pads only. Waffle mattress in place-patient turns fairly well-all interventions in place and implemented-WOC will continue to follow.        Problem: Skin Injury Risk (Adult)  Goal: Skin Health and Integrity  Outcome: Ongoing (interventions implemented as appropriate)

## 2018-11-12 NOTE — THERAPY TREATMENT NOTE
Acute Care - Physical Therapy Treatment Note  Psychiatric     Patient Name: Katharine Mendoza  : 1931  MRN: [id number]  Today's Date: 2018  Onset of Illness/Injury or Date of Surgery: 18  Date of Referral to PT: 18  Referring Physician: MD Shelly    Admit Date: 2018    Visit Dx:    ICD-10-CM ICD-9-CM   1. Community acquired pneumonia of right lower lobe of lung (CMS/HCC) J18.1 481   2. Hypoxemia R09.02 799.02   3. Acute respiratory failure with hypoxia (CMS/HCC) J96.01 518.81   4. Oropharyngeal dysphagia R13.12 787.22   5. Impaired mobility and ADLs Z74.09 799.89   6. Impaired functional mobility, balance, gait, and endurance Z74.09 V49.89     Patient Active Problem List   Diagnosis   • Possible Aspiration pneumonia (CMS/HCC), right lower lobe   • Accident due to mechanical fall without injury   • Hypotension   • New onset Atrial fibrillation with rapid ventricular response (CMS/HCC)   • Elevated troponin   • Acute respiratory failure with hypoxia (CMS/HCC)   • Late onset Alzheimer's disease without behavioral disturbance   • Community acquired pneumonia of right lower lobe of lung (CMS/HCC)       Therapy Treatment    Rehabilitation Treatment Summary     Row Name 18 1110             Treatment Time/Intention    Discipline  physical therapist  -MB      Document Type  therapy note (daily note)  -MB      Subjective Information  no complaints  -MB      Mode of Treatment  physical therapy;individual therapy  -MB      Patient/Family Observations  Pt. sitting UIC, on RA, IV intact.  RN consent for PT.  No family present at bedside.   -MB      Care Plan Review  care plan/treatment goals reviewed;patient/other agree to care plan  -MB      Therapy Frequency (PT Clinical Impression)  daily  -MB      Patient Effort  good  -MB      Comment  Pt. requires pediatric RW.   -MB      Existing Precautions/Restrictions  fall  -MB      Recorded by [MB] La Daley, PT 18 7560      Kevin  Name 11/12/18 1110             Vital Signs    Pre Systolic BP Rehab  -- VSS.  RN cleared for PT.  -MB      Pre Patient Position  Sitting  -MB      Intra Patient Position  Standing  -MB      Post Patient Position  Sitting  -MB      Recorded by [MB] La Daley, PT 11/12/18 1329      Row Name 11/12/18 1110             Cognitive Assessment/Intervention- PT/OT    Affect/Mental Status (Cognitive)  confused  -MB      Orientation Status (Cognition)  oriented to;person;place;verbal cues/prompts needed for orientation;time  -MB      Follows Commands (Cognition)  follows one step commands;over 90% accuracy;verbal cues/prompting required  -MB      Safety Deficit (Cognitive)  moderate deficit;awareness of need for assistance;insight into deficits/self awareness;safety precautions awareness  -MB      Personal Safety Interventions  fall prevention program maintained;gait belt;nonskid shoes/slippers when out of bed;muscle strengthening facilitated  -MB      Recorded by [MB] La Daley, PT 11/12/18 1329      Row Name 11/12/18 1110             Bed Mobility Assessment/Treatment    Comment (Bed Mobility)  Pt. received and left sitting UIC.  -MB      Recorded by [MB] La Daley, PT 11/12/18 1329      Row Name 11/12/18 1110             Transfer Assessment/Treatment    Transfer Assessment/Treatment  sit-stand transfer;stand-sit transfer  -MB      Comment (Transfers)  Pt. required VCs positioning and safe hand placement.  -MB      Recorded by [MB] La Daley, PT 11/12/18 1329      Row Name 11/12/18 1110             Sit-Stand Transfer    Sit-Stand [place] (Transfers)  contact guard;verbal cues  -MB      Assistive Device (Sit-Stand Transfers)  walker, front-wheeled  -MB      Recorded by [MB] La Daley, PT 11/12/18 1329      Row Name 11/12/18 1110             Stand-Sit Transfer    Stand-Sit [place] (Transfers)  contact guard;verbal cues  -MB      Assistive Device (Stand-Sit Transfers)   walker, front-wheeled  -MB      Recorded by [MB] La Daley, PT 11/12/18 1329      Row Name 11/12/18 1110             Gait/Stairs Assessment/Training    [place] Level (Gait)  minimum assist (75% patient effort);verbal cues  -MB      Assistive Device (Gait)  walker, front-wheeled  -MB      Distance in Feet (Gait)  100  -MB      Pattern (Gait)  step-through  -MB      Deviations/Abnormal Patterns (Gait)  brooke decreased;base of support, narrow;gait speed decreased;stride length decreased  -MB      Bilateral Gait Deviations  forward flexed posture;heel strike decreased  -MB      Right Sided Gait Deviations  weight shift ability decreased  -MB      Comment (Gait/Stairs)  Pt. demo step through gait pattern w/ slow brooke, decreased stance phase R LE.  Pt. required VCs for upright posture (tends to lean forearms on RW) and increased B step length.  Min improvement noted w/ cues.  -MB      Recorded by [MB] La Daley, PT 11/12/18 1329      Row Name 11/12/18 1110             Balance    Balance  dynamic standing balance;static standing balance  -MB      Recorded by [MB] La Daley, PT 11/12/18 1329      Row Name 11/12/18 1110             Static Standing Balance    Level of [place] (Supported Standing, Static Balance)  contact guard assist  -MB      Time Able to Maintain Position (Supported Standing, Static Balance)  1 to 2 minutes  -MB      Assistive Device Utilized (Supported Standing, Static Balance)  rolling walker  -MB      Recorded by [MB] La Daley, PT 11/12/18 1329      Row Name 11/12/18 1110             Dynamic Standing Balance    Level of [place], Reaches Outside Midline (Standing, Dynamic Balance)  contact guard assist  -MB      Time Able to Maintain Position, Reaches Outside Midline (Standing, Dynamic Balance)  1 to 2 minutes  -MB      Comment, Reaches Outside Midline (Standing, Dynamic Balance)  w/ RW, reaching out of ANCELMO w/ min excursions  -MB      Recorded  by [MB] La Daley, PT 11/12/18 1329      Row Name 11/12/18 1110             Positioning and Restraints    Pre-Treatment Position  sitting in chair/recliner  -MB      Post Treatment Position  chair  -MB      In Chair  notified nsg;reclined;call light within reach;encouraged to call for assist;exit alarm on;legs elevated  -MB      Recorded by [MB] La Daley, PT 11/12/18 1329      Row Name 11/12/18 1110             Pain Scale: Numbers Pre/Post-Treatment    Pain Scale: Numbers, Pretreatment  0/10 - no pain  -MB      Pain Scale: Numbers, Post-Treatment  0/10 - no pain  -MB      Recorded by [MB] La Daley, PT 11/12/18 1329      Row Name                Wound 11/10/18 2107  MASD (moisture associated skin damage)    Wound - Properties Group Date first assessed: 11/10/18 [SH] Time first assessed: 2107 [] Location: -- [SH], perianal, perineum  Type: MASD (moisture associated skin damage) [SH] Recorded by:  [SH] Yashira Jean Baptiste RN 11/12/18 1050    Row Name 11/12/18 1110             Plan of Care Review    Plan of Care Reviewed With  patient  -MB      Recorded by [MB] La Daley, PT 11/12/18 1329      Row Name 11/12/18 1110             Outcome Summary/Treatment Plan (PT)    Daily Summary of Progress (PT)  progress toward functional goals as expected  -MB      Recorded by [MB] La Daley, PT 11/12/18 1329        User Key  (r) = Recorded By, (t) = Taken By, (c) = Cosigned By    Initials Name Effective Dates Discipline    La [place], PT 03/14/16 -  PT    SH Yashira Jean Baptiste RN 03/16/18 -  Nurse          Wound 11/10/18 2107  MASD (moisture associated skin damage) (Active)   Base white;red/granulating;moist 11/12/2018  9:19 AM   Periwound redness;moist;excoriated 11/12/2018  9:19 AM   Edges irregular 11/12/2018  9:19 AM   Drainage Amount none 11/12/2018  9:19 AM   Care, Wound barrier applied;other (see comments) 11/12/2018  9:19 AM   Dressing Care, Wound open to air 11/12/2018   9:19 AM           Physical Therapy Education     Title: PT OT SLP Therapies (Active)     Topic: Physical Therapy (Active)     Point: Mobility training (Active)     Learning Progress Summary           Patient Acceptance, E, NR by  at 11/9/2018 10:40 AM   Family Acceptance, E, NR by  at 11/9/2018 10:40 AM                   Point: Home exercise program (Active)     Learning Progress Summary           Patient Acceptance, E, NR by  at 11/9/2018 10:40 AM   Family Acceptance, E, NR by  at 11/9/2018 10:40 AM                   Point: Body mechanics (Active)     Learning Progress Summary           Patient Acceptance, E, NR by  at 11/9/2018 10:40 AM   Family Acceptance, E, NR by  at 11/9/2018 10:40 AM                   Point: Precautions (Active)     Learning Progress Summary           Patient Acceptance, E, NR by  at 11/9/2018 10:40 AM   Family Acceptance, E, NR by  at 11/9/2018 10:40 AM                               User Key     Initials Effective Dates Name Provider Type Discipline     04/03/18 -  Lona Mccarty, PT Physical Therapist PT                PT Recommendation and Plan  Therapy Frequency (PT Clinical Impression): daily  Outcome Summary/Treatment Plan (PT)  Daily Summary of Progress (PT): progress toward functional goals as expected  Plan of Care Reviewed With: patient  Progress: improving  Outcome Summary: Pt. demo steady progress w/ mobility; however, continues to require cues for safety.  Pt. performed sit to stand transfers w/ RW and CGA, and ambulated 100ft. w/ RW and min A, cues for safety.  Pt. readily fatigues and requires encouragement to progress mobility.  Recommend cont IPPT per POC.  Outcome Measures     Row Name 11/12/18 1110             How much help from another person do you currently need...    Turning from your back to your side while in flat bed without using bedrails?  4  -MB      Moving from lying on back to sitting on the side of a flat bed without bedrails?  3  -MB       Moving to and from a bed to a chair (including a wheelchair)?  3  -MB      Standing up from a chair using your arms (e.g., wheelchair, bedside chair)?  3  -MB      Climbing 3-5 steps with a railing?  2  -MB      To walk in hospital room?  3  -MB      AM-PAC 6 Clicks Score  18  -MB         Functional Assessment    Outcome Measure Options  AM-PAC 6 Clicks Basic Mobility (PT)  -MB        User Key  (r) = Recorded By, (t) = Taken By, (c) = Cosigned By    Initials Name Provider Type    [place], PT Physical Therapist         Time Calculation:   PT Charges     Row Name 11/12/18 1333             Time Calculation    Start Time  1110  -MB      PT Received On  11/12/18  -MB      PT Goal Re-Cert Due Date  11/19/18  -MB         Time Calculation- PT    Total Timed Code Minutes- PT  30 minute(s)  -MB         Timed Charges    97593 - PT Therapeutic Exercise Minutes  15  -MB      25418 - Gait Training Minutes   15  -MB        User Key  (r) = Recorded By, (t) = Taken By, (c) = Cosigned By    Initials Name Provider Type    [place], PT Physical Therapist        Therapy Suggested Charges     Code   Minutes Charges    29735 (CPT®) Hc Pt Neuromusc Re Education Ea 15 Min      26272 (CPT®) Hc Pt Ther Proc Ea 15 Min 15 1    02155 (CPT®) Hc Gait Training Ea 15 Min 15 1    28725 (CPT®) Hc Pt Therapeutic Act Ea 15 Min      19655 (CPT®) Hc Pt Manual Therapy Ea 15 Min      07635 (CPT®) Hc Pt Iontophoresis Ea 15 Min      67860 (CPT®) Hc Pt Elec Stim Ea-Per 15 Min      71836 (CPT®) Hc Pt Ultrasound Ea 15 Min      11543 (CPT®) Hc Pt Self Care/Mgmt/Train Ea 15 Min      53985 (CPT®) Hc Pt Prosthetic (S) Train Initial Encounter, Each 15 Min      25899 (CPT®) Hc Pt Orthotic(S)/Prosthetic(S) Encounter, Each 15 Min      66876 (CPT®) Hc Orthotic(S) Mgmt/Train Initial Encounter, Each 15min      Total  30 2        Therapy Charges for Today     Code Description Service Date Service Provider Modifiers Qty    86911101891 HC PT  THER PROC EA 15 MIN 11/12/2018 La Daley, PT GP 1    32192846722 HC GAIT TRAINING EA 15 MIN 11/12/2018 aL Daley, PT GP 1          PT G-Codes  Outcome Measure Options: AM-PAC 6 Clicks Basic Mobility (PT)  AM-PAC 6 Clicks Score: 18  Score: 15    La Daley, PT  11/12/2018

## 2018-11-12 NOTE — PROGRESS NOTES
Continued Stay Note  [facility]     Patient Name: Katharine Mendoza  MRN: [id number]  Today's Date: 11/12/2018    Admit Date: 11/7/2018    Discharge Plan     Row Name 11/12/18 1655       Plan    Plan  [facility]    Patient/Family in Agreement with Plan  yes    Plan Comments  Spoke with SHAI Carey from [facility] this morning. On site eval completed this evening by Cherry and patient is acceptable to return back to [facility] on 11/13. Son Benson will transport and would like to be called when discharge papers are complete as he works local and can be here in 10 minutes. CM to fax discharge summary to [phone]. Nurse will call report to Tim Carey at [phone]. Patient will need hard Rx's for any new or changed medication to go with her to the facility. If any questions or concerns, CM can call Cherry GIBBONS on her personal cell at [phone].         Discharge Codes    No documentation.       Expected Discharge Date and Time     Expected Discharge Date Expected Discharge Time    Nov 12, 2018             Kenna Cota RN

## 2018-11-12 NOTE — MBS/VFSS/FEES
Acute Care - Speech Language Pathology   Swallow Re-Evaluation  Anu   Modified Barium Swallow Study (MBS)     Patient Name: Katharine Mendoza  : 1931  MRN: [id number]  Today's Date: 2018  Onset of Illness/Injury or Date of Surgery: 18     Referring Physician: MD Shelly      Admit Date: 2018    Visit Dx:     ICD-10-CM ICD-9-CM   1. Community acquired pneumonia of right lower lobe of lung (CMS/HCC) J18.1 481   2. Hypoxemia R09.02 799.02   3. Acute respiratory failure with hypoxia (CMS/HCC) J96.01 518.81   4. Oropharyngeal dysphagia R13.12 787.22   5. Impaired mobility and ADLs Z74.09 799.89   6. Impaired functional mobility, balance, gait, and endurance Z74.09 V49.89     Patient Active Problem List   Diagnosis   • Possible Aspiration pneumonia (CMS/HCC), right lower lobe   • Accident due to mechanical fall without injury   • Hypotension   • New onset Atrial fibrillation with rapid ventricular response (CMS/HCC)   • Elevated troponin   • Acute respiratory failure with hypoxia (CMS/HCC)   • Late onset Alzheimer's disease without behavioral disturbance   • Community acquired pneumonia of right lower lobe of lung (CMS/HCC)     Past Medical History:   Diagnosis Date   • Anxiety    • Dementia    • Disease of thyroid gland    • Hyperlipidemia    • Osteoporosis      History reviewed. No pertinent surgical history.     SWALLOW EVALUATION (last 72 hours)      [facility] Adult Swallow Evaluation     Row Name 18 1015                   Rehab Evaluation    Document Type  re-evaluation  -MP        Subjective Information  no complaints  -MP        Patient Observations  alert;cooperative  -MP        Patient/Family Observations  No family present  -MP        Patient Effort  good  -MP           General Information    Patient Profile Reviewed  yes  -MP        Pertinent History Of Current Problem  See previous eval.  -MP        Current Method of Nutrition  pureed;honey-thick liquids  -MP         Precautions/Limitations, Vision  WFL with corrective lenses;for purposes of eval  -MP        Precautions/Limitations, Hearing  WFL;for purposes of eval  -MP        Prior Level of Function-Communication  cognitive-linguistic impairment;other (see comments) Alzheimer's dementia  -MP        Prior Level of Function-Swallowing  no diet consistency restrictions  -MP        Plans/Goals Discussed with  patient;agreed upon  -MP        Barriers to Rehab  cognitive status  -MP        Patient's Goals for Discharge  patient did not state  -MP           Pain Assessment    Additional Documentation  Pain Scale: FACES Pre/Post-Treatment (Group)  -MP           Pain Scale: FACES Pre/Post-Treatment    Pain: FACES Scale, Pretreatment  2-->hurts little bit  -MP        Pain: FACES Scale, Post-Treatment  2-->hurts little bit  -MP           Oral Motor and Function    Dentition Assessment  missing teeth  -MP        Secretion Management  WNL/WFL  -MP           General Eating/Swallowing Observations    Respiratory Support Currently in Use  room air  -MP           MBS/VFSS    Utensils Used  spoon;cup;straw  -MP        Consistencies Trialed  thin liquids;pudding thick;regular textures  -MP           MBS/VFSS Interpretation    Oral Prep Phase  impaired oral phase of swallowing  -MP        Oral Transit Phase  impaired  -MP        Oral Residue  impaired  -MP        VFSS Summary  SLP MBS evaluation completed. Pt presents w/ mild-moderate oropharyngeal dysphagia. Prolonged manipulation/mastication and increased A-P transit time w/ regular solid. Mild diffuse oral residue w/ all consistencies tested that typically cleared w/ spontaneous subsequent swallows. Prespill + delay to the level of the pyriform sinuses resulted in aspiration before the swallow w/ thin liquid via straw. Aspiration was eliminated w/ small, single sips of thin liquid via cup. No penetration/aspiration w/ pudding or solid. Mild vallecular residue w/ pudding and solid 2' reduced  base of tongue retraction. Recommend soft, whole diet w/ thin liquids via small, single cup sips. Meds whole or crushed in puree. Follow standard aspiration precautions.  -MP           Oral Preparatory Phase    Oral Preparatory Phase  prolonged manipulation  -MP        Prolonged Manipulation  regular textures  -MP           Oral Transit Phase    Impaired Oral Transit Phase  increased A-P transit time;premature spillage of liquids into pharynx  -MP        Increased A-P Transit Time  regular textures  -MP        Premature Spillage of Liquids into Pharynx  thin liquids;other (see comments) only via straw  -MP           Oral Residue    Impaired Oral Residue  diffuse residue throughout oral cavity  -MP        Diffuse Residue throughout Oral Cavity  all consistencies tested  -MP        Response to Oral Residue  cleared residue;with spontaneous subsequent swallow  -MP           Initiation of Pharyngeal Swallow    Initiation of Pharyngeal Swallow  bolus in pyriform sinuses  -MP        Pharyngeal Phase  impaired pharyngeal phase of swallowing  -MP        Aspiration Before the Swallow  thin liquids;secondary to reduced back of tongue control;secondary to delayed swallow initiation or mistiming;other (see comments) only via straw  -MP        Response to Aspiration  throat clear  -MP        Rosenbek's Scale  thin:;7--->level 7  -MP        Pharyngeal Residue  pudding/puree;regular textures;base of tongue;valleculae;secondary to reduced base of tongue retraction  -MP        Response to Residue  unable to clear residue  -MP        Attempted Compensatory Maneuvers  bolus size;bolus presentation style  -MP        Response to Attempted Compensatory Maneuvers  prevented aspiration  -MP           SLP Communication to Radiology    Severity Level of Dysphagia  mild-moderate dysphagia  -MP        Consistencies Aspirated/Penetrated  aspirated;thin liquids;other (see comments) only via straw  -MP           Clinical Impression    SLP  Swallowing Diagnosis  mild-moderate;oral dysfunction;pharyngeal dysfunction  -MP        Functional Impact  risk of aspiration/pneumonia  -MP        Rehab Potential/Prognosis, Swallowing  adequate, monitor progress closely  -MP        Swallow Criteria for Skilled Therapeutic Interventions Met  demonstrates skilled criteria  -MP           Recommendations    Therapy Frequency (Swallow)  3 days per week  -MP        Predicted Duration Therapy Intervention (Days)  until discharge  -MP        SLP Diet Recommendation  soft textures;whole;thin liquids;other (see comments) via small single cup sips. NO straws  -MP        Recommended Precautions and Strategies  upright posture during/after eating;small bites of food and sips of liquid;no straw  -MP        SLP Rec. for Method of Medication Administration  meds whole;meds crushed;with pudding or applesauce  -MP        Monitor for Signs of Aspiration  yes;notify SLP if any concerns  -MP        Anticipated Dischage Disposition  unknown;anticipate therapy at next level of care  -MP          User Key  (r) = Recorded By, (t) = Taken By, (c) = Cosigned By    Initials Name Effective Dates    Lonnie Cullen MS, PADMINI-SLP 08/15/18 -           EDUCATION  The patient has been educated in the following areas:   Dysphagia (Swallowing Impairment) Modified Diet Instruction.    SLP Recommendation and Plan  SLP Swallowing Diagnosis: mild-moderate, oral dysfunction, pharyngeal dysfunction  SLP Diet Recommendation: soft textures, whole, thin liquids, other (see comments)(via small single cup sips. NO straws)  Recommended Precautions and Strategies: upright posture during/after eating, small bites of food and sips of liquid, no straw     Monitor for Signs of Aspiration: yes, notify SLP if any concerns     Swallow Criteria for Skilled Therapeutic Interventions Met: demonstrates skilled criteria  Anticipated Dischage Disposition: unknown, anticipate therapy at next level of care  Rehab  Potential/Prognosis, Swallowing: adequate, monitor progress closely  Therapy Frequency (Swallow): 3 days per week  Predicted Duration Therapy Intervention (Days): until discharge       Plan of Care Reviewed With: patient  Plan of Care Review  Plan of Care Reviewed With: patient  Daily Summary of Progress (SLP): progress toward functional goals as expected  Plan for Continued Treatment (SLP): SLP will continue to follow for dysphagia tx    SLP GOALS     Row Name 11/12/18 1015 11/09/18 1415          Oral Nutrition/Hydration Goal 1 (SLP)    Oral Nutrition/Hydration Goal 1, SLP  LTG: Pt will tolerate soft, whole diet w/ thin liquid w/ no overt s/sxs aspiration w/ 100% acc and no cues  -MP  LTG: Pt will return to soft whole foods and thin liquids w/ 100% accuracy w/o cues.   -MP     Time Frame (Oral Nutrition/Hydration Goal 1, SLP)  by discharge  -MP  by discharge  -MP     Progress/Outcomes (Oral Nutrition/Hydration Goal 1, SLP)  --  continuing progress toward goal  -MP        Oral Nutrition/Hydration Goal 2 (SLP)    Oral Nutrition/Hydration Goal 2, SLP  Pt will tolerate small bites/sips of Honey-thick liquids and Level 2-pureed w/ no overt s/s of aspiration or complications w/ 100% accuracy w/o cues.   -MP  Pt will tolerate small bites/sips of Honey-thick liquids and Level 2-pureed w/ no overt s/s of aspiration or complications w/ 100% accuracy w/o cues.   -MP     Time Frame (Oral Nutrition/Hydration Goal 2, SLP)  short term goal (STG);by discharge  -MP  short term goal (STG);by discharge  -MP     Barriers (Oral Nutrition/Hydration Goal 2, SLP)  --  Pt tolerated trials of HTL and puree w/ no overt s/sxs aspiration  -MP     Progress/Outcomes (Oral Nutrition/Hydration Goal 2, SLP)  goal no longer appropriate  -MP  good progress toward goal  -MP        Lingual Strengthening Goal 1 (SLP)    Activity (Lingual Strengthening Goal 1, SLP)  increase tongue back strength  -MP  increase tongue back strength  -MP     Increase  Tongue Back Strength  lingual resistance exercises  -MP  lingual resistance exercises  -MP     [place]/Accuracy (Lingual Strengthening Goal 1, SLP)  with minimal cues (75-90% accuracy)  -MP  with minimal cues (75-90% accuracy)  -MP     Time Frame (Lingual Strengthening Goal 1, SLP)  short term goal (STG);by discharge  -MP  short term goal (STG);by discharge  -MP     Barriers (Lingual Strengthening Goal 1, SLP)  --  Provided handout and demonstration of exercises. Completed 2 reps of each w/ SLP.  -MP     Progress/Outcomes (Lingual Strengthening Goal 1, SLP)  --  continuing progress toward goal  -MP        Pharyngeal Strengthening Exercise Goal 1 (SLP)    Activity (Pharyngeal Strengthening Goal 1, SLP)  increase timing;increase closure at entrance to airway/closure of airway at glottis;increase squeeze/positive pressure generation;increase tongue base retraction  -MP  increase timing;increase superior movement of the hyolaryngeal complex;increase closure at entrance to airway/closure of airway at glottis;increase squeeze/positive pressure generation;increase tongue base retraction  -MP     Increase Timing  prepping - 3 second prep or suck swallow or 3-step swallow  -MP  prepping - 3 second prep or suck swallow or 3-step swallow  -MP     Increase Superior Movement of the Hyolaryngeal Complex  --  supraglottic swallow  -MP     Increase Closure at Entrance to Airway/Closure of Airway at Glottis  super-supraglottic swallow  -MP  super-supraglottic swallow  -MP     Increase Squeeze/Positive Pressure Generation  effortful pitch glide (falsetto + pharyngeal squeeze)  -MP  effortful pitch glide (falsetto + pharyngeal squeeze)  -MP     Increase Tongue Base Retraction  hard effortful swallow;hernan  -MP  hard effortful swallow  -MP     [place]/Accuracy (Pharyngeal Strengthening Goal 1, SLP)  with minimal cues (75-90% accuracy)  -MP  with minimal cues (75-90% accuracy)  -MP     Time Frame (Pharyngeal Strengthening  Goal 1, SLP)  short term goal (STG);by discharge  -MP  short term goal (STG);by discharge  -MP     Barriers (Pharyngeal Strengthening Goal 1, SLP)  --  Provided handout and demonstration of exercises to pt. Completed two reps of each w/ SLP.  -MP     Progress/Outcomes (Pharyngeal Strengthening Goal 1, SLP)  goal revised this date  -MP  continuing progress toward goal  -MP        Swallow Management Recall Goal 1 (SLP)    Activity (Swallow Management Recall Goal 1, SLP)  recall of;safe diet/liquid level;compensatory swallow strategies/techniques  -MP  recall of;safe diet/liquid level;safe liquid viscosity;compensatory swallow strategies/techniques  -MP     [place]/Accuracy (Swallow Management Recall Goal 1, SLP)  with minimal cues (75-90% accuracy)  -MP  with minimal cues (75-90% accuracy)  -MP     Time Frame (Swallow Management Recall Goal 1, SLP)  short term goal (STG);by discharge  -MP  short term goal (STG);by discharge  -MP     Barriers (Swallow Management Recall Goal 1, SLP)  --  Pt confused and reported she had not been drinking thickened liquid. However, pt's lunch tray still in room w/ HTL on tray.  -MP     Progress/Outcomes (Swallow Management Recall Goal 1, SLP)  --  continuing progress toward goal  -MP        Swallow Compensatory Strategies Goal 1 (SLP)    Activity (Swallow Compensatory Strategies/Techniques Goal 1, SLP)  compensatory strategies;small cup sips;small bites;during meal intake  -MP  --     [place]/Accuracy (Swallow Compensatory Strategies/Techniques Goal 1, SLP)  with minimal cues (75-90% accuracy)  -MP  --     Time Frame (Swallow Compensatory Strategies/Techniques Goal 1, SLP)  short term goal (STG);by discharge  -MP  --       User Key  (r) = Recorded By, (t) = Taken By, (c) = Cosigned By    Initials Name Provider Type    Lonnie Cullen, MS, CFY-SLP Speech and Language Pathologist             Time Calculation:   Time Calculation- SLP     Row Name 11/12/18 1144             Time  Calculation- SLP    SLP Start Time  1015  -MP      SLP Received On  11/12/18  -MP        User Key  (r) = Recorded By, (t) = Taken By, (c) = Cosigned By    Initials Name Provider Type    Lonnie Cullen MS, CFY-SLP Speech and Language Pathologist          Therapy Charges for Today     Code Description Service Date Service Provider Modifiers Qty    [id number] HC ST MOTION FLUORO EVAL SWALLOW 5 11/12/2018 Lonnie Aguilar MS, CFY-SLP GN 1               Lonnie Aguilar MS, PADMINI-SLP  11/12/2018

## 2018-11-12 NOTE — PLAN OF CARE
Problem: Patient Care Overview  Goal: Plan of Care Review  Outcome: Ongoing (interventions implemented as appropriate)   11/12/18 1327   Coping/Psychosocial   Plan of Care Reviewed With patient   Plan of Care Review   Progress improving   OTHER   Outcome Summary VSS. Pt amulating in hallway with PT. Plan to return to [facility] in AM. Family will transport.

## 2018-11-12 NOTE — PAYOR COMM NOTE
"Jose Manuel Waldrop (87 y.o. Female)     Date of Birth Social Security Number Address Home Phone MRN    1931   [place] [address] [phone] [id number]    Oriental orthodox Marital Status          Pentecostal        Admission Date Admission Type Admitting Provider Attending Provider Department, Room/Bed    18 Emergency Asim Bravo MD Schnell, Aaron, MD [facility] 4H, S485/1    Discharge Date Discharge Disposition Discharge Destination                       Attending Provider:  Asim Bravo MD    Allergies:  No Known Allergies    Isolation:  None   Infection:  None   Code Status:  CPR    Ht:  154.9 cm (61\")   Wt:  62.7 kg (138 lb 4.8 oz)    Admission Cmt:  None   Principal Problem:  Possible Aspiration pneumonia (CMS/HCC), right lower lobe [J69.0]                 Active Insurance as of 2018     Primary Coverage     Payor Plan Insurance Group Employer/Plan Group    ANTHEM MEDICARE REPLACEMENT ANTHEM MEDICARE ADVANTAGE [id number]     Payor Plan Address Payor Plan Phone Number Payor Plan Fax Number Effective Dates    [address] [phone]  2016 - None Entered    [facility] [address]       Subscriber Name Subscriber Birth Date Member ID       JOSE MANUEL WALDROP 1931 [id number]                 Emergency Contacts      (Rel.) Home Phone Work Phone Mobile Phone    Benson Waldrop (Power of ) [phone] -- --               History & Physical      Nayan Alvarado MD at 2018  4:24 PM              [facility] Medicine Services  HISTORY AND PHYSICAL    Patient Name: Jose Manuel Waldrop  : 1931  MRN: [id number]  Primary Care Physician: Samia Carrington MD  Date of admission: 2018      Subjective   Subjective     Chief Complaint:  Fall at nursing home, cough, weakness    HPI:  Jose Manuel Waldrop is a 87 y.o. female  who lives in assisted living facility per family.  Family was " provided by patient who is a poor historian with dementia and by her son who is at the bedside.  Patient presented with a fall at her assisted living today, she had no loss of consciousness, no focal neurologic reported symptoms, no pain from her fall, she denies injury, she denies head trauma.  Patient's son felt she was pale, stable chronic confusion, reportedly she was found to have tachycardia and cough today prior to being sent to the hospital.  She currently denies any complaints including shortness of breath or palpitation.  She was found to have pneumonia and hypoxic in atrial fibrillation in the emergency department and is being admitted for further medical management.    Review of Systems   Constitutional: Negative for fever.   HENT: Negative.    Eyes: Negative for blurred vision and visual disturbance.   Respiratory: Positive for cough and shortness of breath.    Cardiovascular: Negative for chest pain and palpitations.   Gastrointestinal: Negative.    Endocrine: Negative for cold intolerance and heat intolerance.   Genitourinary: Negative.    Musculoskeletal: Positive for falls. Negative for muscle cramps.   Skin: Negative for rash.   Neurological: Positive for weakness.   Psychiatric/Behavioral: Negative.       Please ignore below review of systems due to redundant computer issue  Review of Systems   Constitution: Positive for weakness. Negative for fever.   HENT: Negative.    Eyes: Negative for blurred vision and visual disturbance.   Cardiovascular: Negative for chest pain and palpitations.   Respiratory: Positive for cough and shortness of breath.    Endocrine: Negative for cold intolerance and heat intolerance.   Skin: Negative for rash.   Musculoskeletal: Positive for falls. Negative for muscle cramps.   Gastrointestinal: Negative.    Genitourinary: Negative.    Psychiatric/Behavioral: Negative.          Personal History     Past Medical History:   Diagnosis Date   • Anxiety    • Dementia    •  Disease of thyroid gland    • Hyperlipidemia    • Osteoporosis        History reviewed. No pertinent surgical history.    Family History: family history is not on file.     Social History:    Social History     Social History Narrative    Lives in assisted living.         Medications:  Available home medication information reviewed     No Known Allergies    Objective   Objective     Vital Signs:   Temp:  [99.3 °F (37.4 °C)] 99.3 °F (37.4 °C)  Heart Rate:  [] 93  Resp:  [18] 18  BP: ()/(48-65) 101/48        Physical Exam   Constitutional: No acute distress, awake, alert, elderly  Eyes: PERRLA, sclerae anicteric, no conjunctival injection  HENT: NCAT, mucous membranes moist  Neck: Supple, no thyromegaly, no lymphadenopathy, trachea midline, no masses  Respiratory: Clear to auscultation bilaterally, mild tachypnea, current respiratory rate is approximately 20, breathing is mildly labored on nasal cannula oxygen.  Cough is present.  Cardiovascular: RRR, no murmurs, rubs, or gallops, palpable pedal pulses bilaterally, trace bilateral lower extremity edema  Gastrointestinal: Positive bowel sounds, soft, nontender, nondistended  Musculoskeletal: Elderly with normal musculature for age, some muscle wasting noted, no clubbing or cyanosis to extremities  Psychiatric: Appropriate affect, cooperative, conversational and pleasant  Neurologic: Patient is oriented to person and place but not time.  She has poor short-term memory.  No slurred speech or facial droop.    Skin: No rashes or jaundice, skin is warm      Results Reviewed:  I have personally reviewed current lab, radiology, and data and agree.      Results from last 7 days  Lab Units 11/07/18  1232   WBC 10*3/mm3 9.07   HEMOGLOBIN g/dL 11.9   HEMATOCRIT % 36.8   PLATELETS 10*3/mm3 154   INR  1.08       Results from last 7 days  Lab Units 11/07/18  1232   SODIUM mmol/L 132   POTASSIUM mmol/L 4.0   CHLORIDE mmol/L 99   CO2 mmol/L 25.0   BUN mg/dL 23    CREATININE mg/dL 0.84   GLUCOSE mg/dL 114*   CALCIUM mg/dL 8.2*   ALT (SGPT) U/L 23   AST (SGOT) U/L 34*   TROPONIN I ng/mL 0.203*     Estimated Creatinine Clearance: 38.9 mL/min (by C-G formula based on SCr of 0.84 mg/dL).  Brief Urine Lab Results     None        BNP   Date Value Ref Range Status   11/07/2018 807.0 (H) 0.0 - 100.0 pg/mL Final     Comment:     Results may be falsely decreased if patient taking Biotin.     Imaging Results (last 24 hours)     Procedure Component Value Units Date/Time    XR Chest 1 View [[id number]] Collected:  11/07/18 1408     Updated:  11/07/18 1431    Narrative:       EXAMINATION: XR CHEST 1 VW- 11/07/2018     INDICATION: cough, hypoxemia, L lung wheezes     TECHNIQUE:  Single view frontal chest.     COMPARISONS: 08/17/2010     FINDINGS:  Calcified granulomata are seen in the left lung. There is  opacity in the periphery of the right lower lobe. No sizable effusion.  No pneumothorax. Atherosclerosis and tortuosity of the aorta. Prominent  cardiopericardial silhouette.       Impression:       Right lower lobe airspace disease which could represent  pneumonia.     D:  11/07/2018  E:  11/07/2018     This report was finalized on 11/7/2018 2:28 PM by Darrel Ribeiro.                Assessment/Plan   Assessment / Plan     Active Hospital Problems    Diagnosis   • **Possible Aspiration pneumonia (CMS/HCC), right lower lobe   • Accident due to mechanical fall without injury   • Hypotension   • New onset Atrial fibrillation with rapid ventricular response (CMS/HCC)   • Elevated troponin   • Acute respiratory failure with hypoxia (CMS/HCC)   • Late onset Alzheimer's disease without behavioral disturbance   • Community acquired pneumonia of right lower lobe of lung (CMS/HCC)         Assessment & Plan:    87-year-old female who presents to the hospital after a fall at her nursing home and was found to have rapid atrial fibrillation, right lower lobe pneumonia, hypotension, elevated  pro-calcitonin, elevated troponin, hypoxia and is being admitted for further medical management.    Possible aspiration pneumonia, right lower lobe:  Patient responding well to antibiotics with azithromycin and ceftriaxone.  Plan to continue.  Speech therapy has been asked to evaluate as well.  Monitor for signs of clinical worsening.  Plan for supportive care and symptom treatment.    Mechanical fall without injury:  Patient reports falling from a sitting position at assisted living.  She denies any pain or injury.  She denies any head trauma.    Hypotension: Likely due to pneumonia.  Blood pressure has improved since earlier pressure in the 90s.  Plan to monitor for now as patient does not appear hypovolemic and has elevated BNP.  Plan for small boluses if needed if further low blood pressures occur.    New-onset atrial fibrillation with rapid ventricular response:   Rapid rate has now normalized and patient is ranging from 70s to 90s.  With low blood pressure plan to hold off on beta blocker for now.  Plan to start heparin drip for atrial fibrillation and elevated troponin and have cardiology evaluate.  Patient will need discussion regarding risks versus benefits of long-term anticoagulation.  She may be a good candidate for low-dose Eliquis.    Elevated troponin: Likely due to supply and demand mismatch as only minimally elevated.  Plan to trend troponin.  Currently on aspirin and heparin.    Acute hypoxic respiratory failure:  Patient with significant hypoxia and saturation in the low 80s at admission.  She has normalized with nasal cannula oxygen.  Continue oxygen and titrate as needed.    Dementia: Continue home medications.  Currently patient is not showing agitation but monitor.  Scheduled melatonin to help with sleep.  If agitation occurs I discussed with son who is okay with low dose Seroquel as needed.  I discussed that this is off label use and I discussed black box warnings.    Chronic anxiety and  chronic benzodiazepine use: Patient chronically takes Valium.  She would likely help withdrawal if held.  Plan to continue for now.              DVT prophylaxis: Heparin drip    CODE STATUS:    Code Status and Medical Interventions:   Ordered at: 18 1622     Code Status:    CPR     Medical Interventions (Level of Support Prior to Arrest):    Full     Comments:    Discussed with patient's son who wanted full code currently but plans to review her living will and bring it to the hospital       Admission Status:  I believe this patient meets inpatient status and will require hospitalization for greater than 2 days due to pneumonia and atrial fibrillation.      Electronically signed by Nayan Alvarado MD, 18, 4:24 PM.          Electronically signed by Nayan Alvarado MD at 2018  4:45 PM          Physician Progress Notes (all)      Asim Bravo MD at 2018  1:58 PM              [facility] Medicine Services  PROGRESS NOTE    Patient Name: Katharine Mendoza  : 1931  MRN: [id number]    Date of Admission: 2018  Length of Stay: 4  Primary Care Physician: Samia Carrington MD    Subjective   Subjective     CC:  Fall / hypoxia / respiratory distress    HPI:  Son in room today.  Seems to be breathing better.   Bedside spirometer revealing hypoventilation somewhere on the order of 30% of expected.      Review of Systems    Gen- No fevers, chills  CV- No chest pain, palpitations  Resp- No cough, dyspnea  GI- No N/V/D, abd pain    Otherwise ROS is negative except as mentioned in the HPI.    Objective   Objective     Vital Signs:   Temp:  [97.9 °F (36.6 °C)] 97.9 °F (36.6 °C)  Heart Rate:  [] 56  Resp:  [15-18] 15  BP: ()/(43-64) 93/43     Physical Exam:    Constitutional: No acute distress, awake, alert  HENT: NCAT, mucous membranes moist  Respiratory: poor inspiratory effort, rhonchi bilaterally, junky breath sounds of secretions in  airways  Cardiovascular: RRR, s1 and s2  Gastrointestinal: Positive bowel sounds, soft, nontender, nondistended  Musculoskeletal: No bilateral ankle edema  Psychiatric: Appropriate affect, cooperative  Neurologic: Oriented x 3, strength symmetric in all extremities, Cranial Nerves grossly intact to confrontation, speech clear  Skin: No rashes    Results Reviewed:  I have personally reviewed current lab, radiology, and data and agree.    Results from last 7 days   Lab Units  11/08/18   0339  11/08/18   0338  11/07/18   1854  11/07/18   1232   WBC 10*3/mm3   --   6.14   --   9.07   HEMOGLOBIN g/dL   --   10.6*   --   11.9   HEMATOCRIT %   --   33.1*   --   36.8   PLATELETS 10*3/mm3   --   145*   --   154   INR   1.09   --   1.09  1.08     Results from last 7 days   Lab Units  11/08/18   0338  11/07/18   1232   SODIUM mmol/L  136  132   POTASSIUM mmol/L  3.8  4.0   CHLORIDE mmol/L  103  99   CO2 mmol/L  25.0  25.0   BUN mg/dL  24*  23   CREATININE mg/dL  0.78  0.84   GLUCOSE mg/dL  97  114*   CALCIUM mg/dL  7.6*  8.2*   ALT (SGPT) U/L   --   23   AST (SGOT) U/L   --   34*   TROPONIN I ng/mL   --   0.203*     Estimated Creatinine Clearance: 42.5 mL/min (by C-G formula based on SCr of 0.78 mg/dL).  No results found for: BNP    Microbiology Results Abnormal     Procedure Component Value - Date/Time    Blood Culture - Blood, [[id number]] Collected:  11/07/18 1225    Lab Status:  Preliminary result Specimen:  Blood from Wrist, Right Updated:  11/11/18 1300     Blood Culture No growth at 4 days    Blood Culture - Blood, [[id number]] Collected:  11/07/18 1210    Lab Status:  Preliminary result Specimen:  Blood from Arm, Right Updated:  11/11/18 1300     Blood Culture No growth at 4 days    Blood Culture - Blood, [[id number]] Collected:  11/07/18 1946    Lab Status:  Preliminary result Specimen:  Blood from Hand, Left Updated:  11/10/18 2000     Blood Culture No growth at 3 days    Blood Culture - Blood, [[id number]] Collected:   11/07/18 1946    Lab Status:  Preliminary result Specimen:  Blood from Arm, Right Updated:  11/10/18 2000     Blood Culture No growth at 3 days    Respiratory Culture - Sputum, Cough [[id number]] Collected:  11/08/18 1007    Lab Status:  Final result Specimen:  Sputum from Cough Updated:  11/10/18 0654     Respiratory Culture Moderate growth (3+) Normal Respiratory Roseann     Gram Stain Many (4+) WBCs per low power field      Moderate (3+) Epithelial cells per low power field      Moderate (3+) Gram positive cocci in pairs      Few (2+) Gram positive bacilli      Occasional Gram negative bacilli    Influenza A & B, RT PCR - Swab, Nasopharynx [[id number]]  (Normal) Collected:  11/07/18 1326    Lab Status:  Final result Specimen:  Swab from Nasopharynx Updated:  11/07/18 1436     Influenza A PCR Not Detected     Influenza B PCR Not Detected    Influenza A & B, RT PCR - Swab, Nasopharynx [[id number]] Collected:  11/07/18 1239    Lab Status:  Edited Result - FINAL Specimen:  Swab from Nasopharynx Updated:  11/07/18 1304     Influenza A PCR --     Comment: Wrong swab submitted. Recollect requested.   Corrected result. Previous result was Not Detected on 11/7/2018 at 1259 EST.        Influenza B PCR --     Comment: Wrong swab submitted. Recollect requested.   Corrected result. Previous result was Not Detected on 11/7/2018 at 1259 EST.             Imaging Results (last 24 hours)     ** No results found for the last 24 hours. **        Results for orders placed during the hospital encounter of 11/07/18   Adult Transthoracic Echo Complete W/ Cont if Necessary Per Protocol    Narrative · Mild to moderate aortic valve regurgitation is present.  · Mild-to-moderate mitral valve regurgitation is present.  · Mild tricuspid valve regurgitation is present.  · Left ventricular wall thickness is consistent with mild concentric   hypertrophy.  · Left ventricular systolic function is normal. Estimated EF = 70%.  · There is no evidence of  pericardial effusion.  · No evidence of pulmonary hypertension is present.  · The aortic valve exhibits sclerosis.  · Moderate MAC is present.  · Normal right ventricular cavity size, wall thickness, systolic function   and septal motion noted.          I have reviewed the medications.      amoxicillin-clavulanate 1 tablet Oral Q12H   aspirin 81 mg Oral Daily   atorvastatin 10 mg Oral Nightly   cholecalciferol 1,000 Units Oral Daily   donepezil 10 mg Oral Nightly   heparin (porcine) 5,000 Units Subcutaneous Q8H   levothyroxine 88 mcg Oral Once per day on Mon Tue Wed Thu Fri Sat   melatonin 2.5 mg Sublingual Nightly   multivitamin with minerals 1 tablet Oral Daily   sodium chloride 3 mL Intravenous Q12H     Assessment/Plan   Assessment / Plan     Active Hospital Problems    Diagnosis   • **Possible Aspiration pneumonia (CMS/HCC), right lower lobe   • Accident due to mechanical fall without injury   • Hypotension   • New onset Atrial fibrillation with rapid ventricular response (CMS/HCC)   • Elevated troponin   • Acute respiratory failure with hypoxia (CMS/HCC)   • Late onset Alzheimer's disease without behavioral disturbance   • Community acquired pneumonia of right lower lobe of lung (CMS/HCC)     Brief Hospital Course to date:  Katharine Mendoza is a 87 y.o. female from a nursing home who presented after fall.    Assessment & Plan:    Fall  - fall precautions  Aspiration  - place NG tube today to protect airways  - she had respiratory event overnight and needed suctioning  Acute Hypoxic Respiratory  - appears to have aspiration  - no respiratory events reported in the last 24 hours  Elevated Troponin  - no need to trend  - not a candidate for intervention  New Onset A Fib RVR  - high fall risk  - no anticoagulation  Dementia  - unclear how severe  Weakness  Hypoventilation    Modified diet  Wean oxygen as tolerated  Incentive Spirometer Exercises    Discussed case with son today in room    DVT Prophylaxis:   Heparin SC    CODE STATUS:   Code Status and Medical Interventions:   Ordered at: 18 2254     Limited Support to NOT Include:    Intubation     Code Status:    CPR     Medical Interventions (Level of Support Prior to Arrest):    Limited     Comments:    Discussed with patient's son who wanted full code currently but plans to review her living will and bring it to the hospital     Disposition: I expect the patient to be discharged to assisted living on Monday or Tuesday    Electronically signed by Asim Bravo MD, 18, 1:58 PM.      Electronically signed by sAim Bravo MD at 2018  2:01 PM     Asim Bravo MD at 11/10/2018  4:17 PM              [facility] Medicine Services  PROGRESS NOTE    Patient Name: Katharine Mendoza  : 1931  MRN: [id number]    Date of Admission: 2018  Length of Stay: 3  Primary Care Physician: Samia Carrington MD    Subjective   Subjective     CC:  Fall / hypoxia / respiratory distress    HPI:  Different son in room today.  Son today is Col[place].  Only on 2 L/min today.  She denies chest pain or shortness of breath.  No evidence of distress today    Review of Systems    Gen- No fevers, chills  CV- No chest pain, palpitations  Resp- No cough, dyspnea  GI- No N/V/D, abd pain    Otherwise ROS is negative except as mentioned in the HPI.    Objective   Objective     Vital Signs:   Temp:  [97.8 °F (36.6 °C)-98.1 °F (36.7 °C)] 97.9 °F (36.6 °C)  Heart Rate:  [77-88] 77  Resp:  [16-17] 17  BP: (111-130)/(42-64) 124/64     Physical Exam:    Constitutional: No acute distress, awake, alert  HENT: NCAT, mucous membranes moist  Respiratory: poor inspiratory effort, rhonchi bilaterally, junky breath sounds of secretions in airways  Cardiovascular: RRR, s1 and s2  Gastrointestinal: Positive bowel sounds, soft, nontender, nondistended  Musculoskeletal: No bilateral ankle edema  Psychiatric: Appropriate affect, cooperative  Neurologic: Oriented x 3,  strength symmetric in all extremities, Cranial Nerves grossly intact to confrontation, speech clear  Skin: No rashes    Results Reviewed:  I have personally reviewed current lab, radiology, and data and agree.    Results from last 7 days   Lab Units  11/08/18   0339  11/08/18   0338  11/07/18   1854  11/07/18   1232   WBC 10*3/mm3   --   6.14   --   9.07   HEMOGLOBIN g/dL   --   10.6*   --   11.9   HEMATOCRIT %   --   33.1*   --   36.8   PLATELETS 10*3/mm3   --   145*   --   154   INR   1.09   --   1.09  1.08     Results from last 7 days   Lab Units  11/08/18   0338  11/07/18   1232   SODIUM mmol/L  136  132   POTASSIUM mmol/L  3.8  4.0   CHLORIDE mmol/L  103  99   CO2 mmol/L  25.0  25.0   BUN mg/dL  24*  23   CREATININE mg/dL  0.78  0.84   GLUCOSE mg/dL  97  114*   CALCIUM mg/dL  7.6*  8.2*   ALT (SGPT) U/L   --   23   AST (SGOT) U/L   --   34*   TROPONIN I ng/mL   --   0.203*     Estimated Creatinine Clearance: 42.5 mL/min (by C-G formula based on SCr of 0.78 mg/dL).  No results found for: BNP    Microbiology Results Abnormal     Procedure Component Value - Date/Time    Blood Culture - Blood, [[id number]] Collected:  11/07/18 1225    Lab Status:  Preliminary result Specimen:  Blood from Wrist, Right Updated:  11/10/18 1300     Blood Culture No growth at 3 days    Blood Culture - Blood, [[id number]] Collected:  11/07/18 1210    Lab Status:  Preliminary result Specimen:  Blood from Arm, Right Updated:  11/10/18 1300     Blood Culture No growth at 3 days    Respiratory Culture - Sputum, Cough [[id number]] Collected:  11/08/18 1007    Lab Status:  Final result Specimen:  Sputum from Cough Updated:  11/10/18 0654     Respiratory Culture Moderate growth (3+) Normal Respiratory Roseann     Gram Stain Many (4+) WBCs per low power field      Moderate (3+) Epithelial cells per low power field      Moderate (3+) Gram positive cocci in pairs      Few (2+) Gram positive bacilli      Occasional Gram negative bacilli    Blood Culture  - Blood, [[id number]]  (Normal) Collected:  11/07/18 1946    Lab Status:  Preliminary result Specimen:  Blood from Hand, Left Updated:  11/09/18 2000     Blood Culture No growth at 2 days    Blood Culture - Blood, [[id number]]  (Normal) Collected:  11/07/18 1946    Lab Status:  Preliminary result Specimen:  Blood from Arm, Right Updated:  11/09/18 2000     Blood Culture No growth at 2 days    Influenza A & B, RT PCR - Swab, Nasopharynx [[id number]]  (Normal) Collected:  11/07/18 1326    Lab Status:  Final result Specimen:  Swab from Nasopharynx Updated:  11/07/18 1436     Influenza A PCR Not Detected     Influenza B PCR Not Detected    Influenza A & B, RT PCR - Swab, Nasopharynx [[id number]] Collected:  11/07/18 1239    Lab Status:  Edited Result - FINAL Specimen:  Swab from Nasopharynx Updated:  11/07/18 1304     Influenza A PCR --     Comment: Wrong swab submitted. Recollect requested.   Corrected result. Previous result was Not Detected on 11/7/2018 at 1259 EST.        Influenza B PCR --     Comment: Wrong swab submitted. Recollect requested.   Corrected result. Previous result was Not Detected on 11/7/2018 at 1259 EST.             Imaging Results (last 24 hours)     ** No results found for the last 24 hours. **        Results for orders placed during the hospital encounter of 11/07/18   Adult Transthoracic Echo Complete W/ Cont if Necessary Per Protocol    Narrative · Mild to moderate aortic valve regurgitation is present.  · Mild-to-moderate mitral valve regurgitation is present.  · Mild tricuspid valve regurgitation is present.  · Left ventricular wall thickness is consistent with mild concentric   hypertrophy.  · Left ventricular systolic function is normal. Estimated EF = 70%.  · There is no evidence of pericardial effusion.  · No evidence of pulmonary hypertension is present.  · The aortic valve exhibits sclerosis.  · Moderate MAC is present.  · Normal right ventricular cavity size, wall thickness, systolic  function   and septal motion noted.          I have reviewed the medications.      aspirin 81 mg Oral Daily   atorvastatin 10 mg Oral Nightly   azithromycin 250 mg Intravenous Q24H   cholecalciferol 1,000 Units Oral Daily   donepezil 10 mg Oral Nightly   heparin (porcine) 5,000 Units Subcutaneous Q8H   levothyroxine 88 mcg Oral Once per day on Mon Tue Wed Thu Fri Sat   melatonin 2.5 mg Sublingual Nightly   multivitamin with minerals 1 tablet Oral Daily   piperacillin-tazobactam 3.375 g Intravenous Q8H   sodium chloride 3 mL Intravenous Q12H     Assessment/Plan   Assessment / Plan     Active Hospital Problems    Diagnosis   • **Possible Aspiration pneumonia (CMS/HCC), right lower lobe   • Accident due to mechanical fall without injury   • Hypotension   • New onset Atrial fibrillation with rapid ventricular response (CMS/HCC)   • Elevated troponin   • Acute respiratory failure with hypoxia (CMS/HCC)   • Late onset Alzheimer's disease without behavioral disturbance   • Community acquired pneumonia of right lower lobe of lung (CMS/HCC)     Brief Hospital Course to date:  Katharine Mendoza is a 87 y.o. female from a nursing home who presented after fall.    Assessment & Plan:    Fall  - fall precautions  Aspiration  - place NG tube today to protect airways  - she had respiratory event overnight and needed suctioning  Acute Hypoxic Respiratory  - appears to have aspiration  - no respiratory events reported in the last 24 hours  Elevated Troponin  - no need to trend  - not a candidate for intervention  New Onset A Fib RVR  - high fall risk  - no anticoagulation  Dementia  - unclear how severe  Weakness    Modified diet  Up to chair as tolerated  Wean oxygen as tolerated    Discussed case with son Colby today    DVT Prophylaxis:  Heparin SC    CODE STATUS:   Code Status and Medical Interventions:   Ordered at: 11/07/18 8553     Limited Support to NOT Include:    Intubation     Code Status:    CPR     Medical Interventions  (Level of Support Prior to Arrest):    Limited     Comments:    Discussed with patient's son who wanted full code currently but plans to review her living will and bring it to the hospital     Disposition: I expect the patient to be discharged TBD    Electronically signed by Asim Bravo MD, 11/10/18, 4:17 PM.      Electronically signed by Asim Bravo MD at 11/10/2018  4:21 PM     Asim Bravo MD at 2018  4:49 PM              [facility] Medicine Services  PROGRESS NOTE    Patient Name: Katharine Mendoza  : 1931  MRN: [id number]    Date of Admission: 2018  Length of Stay: 2  Primary Care Physician: Samia Carrington MD    Subjective   Subjective     CC:  Fall / hypoxia / respiratory distress    HPI:  No overnight events reported.  Sitting up today.   Denies any chest pain or shortness of breath.  No family in room today.   Son was here yesterday though.    Review of Systems    Gen- No fevers, chills  CV- No chest pain, palpitations  Resp- No cough, dyspnea  GI- No N/V/D, abd pain    Otherwise ROS is negative except as mentioned in the HPI.    Objective   Objective     Vital Signs:   Temp:  [97.7 °F (36.5 °C)-98.2 °F (36.8 °C)] 97.7 °F (36.5 °C)  Heart Rate:  [] 87  Resp:  [16-18] 18  BP: (104-123)/(51-77) 123/61     Physical Exam:    Constitutional: No acute distress, awake, alert  HENT: NCAT, mucous membranes moist  Respiratory: poor inspiratory effort, rhonchi bilaterally, junky breath sounds of secretions in airways  Cardiovascular: RRR, s1 and s2  Gastrointestinal: Positive bowel sounds, soft, nontender, nondistended  Musculoskeletal: No bilateral ankle edema  Psychiatric: Appropriate affect, cooperative  Neurologic: Oriented x 3, strength symmetric in all extremities, Cranial Nerves grossly intact to confrontation, speech clear  Skin: No rashes    Results Reviewed:  I have personally reviewed current lab, radiology, and data and agree.      Results from  last 7 days  Lab Units 11/08/18  0339 11/08/18  0338 11/07/18  1854 11/07/18  1232   WBC 10*3/mm3  --  6.14  --  9.07   HEMOGLOBIN g/dL  --  10.6*  --  11.9   HEMATOCRIT %  --  33.1*  --  36.8   PLATELETS 10*3/mm3  --  145*  --  154   INR  1.09  --  1.09 1.08       Results from last 7 days  Lab Units 11/08/18  0338 11/07/18  1232   SODIUM mmol/L 136 132   POTASSIUM mmol/L 3.8 4.0   CHLORIDE mmol/L 103 99   CO2 mmol/L 25.0 25.0   BUN mg/dL 24* 23   CREATININE mg/dL 0.78 0.84   GLUCOSE mg/dL 97 114*   CALCIUM mg/dL 7.6* 8.2*   ALT (SGPT) U/L  --  23   AST (SGOT) U/L  --  34*   TROPONIN I ng/mL  --  0.203*     Estimated Creatinine Clearance: 42.1 mL/min (by C-G formula based on SCr of 0.78 mg/dL).  BNP   Date Value Ref Range Status   11/07/2018 807.0 (H) 0.0 - 100.0 pg/mL Final     Comment:     Results may be falsely decreased if patient taking Biotin.       Microbiology Results Abnormal     Procedure Component Value - Date/Time    Blood Culture - Blood, [[id number]]  (Normal) Collected:  11/07/18 1225    Lab Status:  Preliminary result Specimen:  Blood from Wrist, Right Updated:  11/09/18 1300     Blood Culture No growth at 2 days    Blood Culture - Blood, [[id number]]  (Normal) Collected:  11/07/18 1210    Lab Status:  Preliminary result Specimen:  Blood from Arm, Right Updated:  11/09/18 1300     Blood Culture No growth at 2 days    Respiratory Culture - Sputum, Cough [[id number]] Collected:  11/08/18 1007    Lab Status:  Preliminary result Specimen:  Sputum from Cough Updated:  11/09/18 0731     Respiratory Culture Light growth (2+) Normal Respiratory Roseann     Gram Stain Result Many (4+) WBCs per low power field      Moderate (3+) Epithelial cells per low power field      Moderate (3+) Gram positive cocci in pairs      Few (2+) Gram positive bacilli      Occasional Gram negative bacilli    Blood Culture - Blood, [[id number]]  (Normal) Collected:  11/07/18 1946    Lab Status:  Preliminary result Specimen:  Blood from  Hand, Left Updated:  11/08/18 2000     Blood Culture No growth at 24 hours    Blood Culture - Blood, [[id number]]  (Normal) Collected:  11/07/18 1946    Lab Status:  Preliminary result Specimen:  Blood from Arm, Right Updated:  11/08/18 2000     Blood Culture No growth at 24 hours    Influenza A & B, RT PCR - Swab, Nasopharynx [[id number]]  (Normal) Collected:  11/07/18 1326    Lab Status:  Final result Specimen:  Swab from Nasopharynx Updated:  11/07/18 1436     Influenza A PCR Not Detected     Influenza B PCR Not Detected    Influenza A & B, RT PCR - Swab, Nasopharynx [[id number]] Collected:  11/07/18 1239    Lab Status:  Edited Result - FINAL Specimen:  Swab from Nasopharynx Updated:  11/07/18 1304     Influenza A PCR --     Comment: Wrong swab submitted. Recollect requested.   Corrected result. Previous result was Not Detected on 11/7/2018 at 1259 EST.        Influenza B PCR --     Comment: Wrong swab submitted. Recollect requested.   Corrected result. Previous result was Not Detected on 11/7/2018 at 1259 EST.             Imaging Results (last 24 hours)     ** No results found for the last 24 hours. **        Results for orders placed during the hospital encounter of 11/07/18   Adult Transthoracic Echo Complete W/ Cont if Necessary Per Protocol    Narrative · Mild to moderate aortic valve regurgitation is present.  · Mild-to-moderate mitral valve regurgitation is present.  · Mild tricuspid valve regurgitation is present.  · Left ventricular wall thickness is consistent with mild concentric   hypertrophy.  · Left ventricular systolic function is normal. Estimated EF = 70%.  · There is no evidence of pericardial effusion.  · No evidence of pulmonary hypertension is present.  · The aortic valve exhibits sclerosis.  · Moderate MAC is present.  · Normal right ventricular cavity size, wall thickness, systolic function   and septal motion noted.          I have reviewed the medications.      aspirin 81 mg Oral Daily    atorvastatin 10 mg Oral Nightly   azithromycin 250 mg Intravenous Q24H   cholecalciferol 1,000 Units Oral Daily   donepezil 10 mg Oral Nightly   heparin (porcine) 5,000 Units Subcutaneous Q8H   levothyroxine 88 mcg Oral Once per day on Mon Tue Wed Thu Fri Sat   melatonin 2.5 mg Sublingual Nightly   multivitamin with minerals 1 tablet Oral Daily   piperacillin-tazobactam 3.375 g Intravenous Q8H   sodium chloride 3 mL Intravenous Q12H     Assessment/Plan   Assessment / Plan     Active Hospital Problems    Diagnosis   • **Possible Aspiration pneumonia (CMS/HCC), right lower lobe   • Accident due to mechanical fall without injury   • Hypotension   • New onset Atrial fibrillation with rapid ventricular response (CMS/HCC)   • Elevated troponin   • Acute respiratory failure with hypoxia (CMS/HCC)   • Late onset Alzheimer's disease without behavioral disturbance   • Community acquired pneumonia of right lower lobe of lung (CMS/HCC)     Brief Hospital Course to date:  Katharine Mendoza is a 87 y.o. female from a nursing home who presented after fall.    Assessment & Plan:    Fall  - fall precautions  Aspiration  - place NG tube today to protect airways  - she had respiratory event overnight and needed suctioning  Acute Hypoxic Respiratory  - appears to have aspiration  - NPO due to respiratory event overnight  Elevated Troponin  - no need to trend  - not a candidate for intervention  New Onset A Fib RVR  - high fall risk  - no anticoagulation  Dementia  - unclear how severe  Weakness    Modified diet  Up to chair  Wean oxygen as tolerated    DVT Prophylaxis:  Heparin SC    CODE STATUS:   Code Status and Medical Interventions:   Ordered at: 11/07/18 9480     Limited Support to NOT Include:    Intubation     Code Status:    CPR     Medical Interventions (Level of Support Prior to Arrest):    Limited     Comments:    Discussed with patient's son who wanted full code currently but plans to review her living will and bring it  to the hospital     Disposition: I expect the patient to be discharged TBD    Electronically signed by Asim Bravo MD, 18, 4:49 PM.      Electronically signed by Asim Bravo MD at 2018  4:53 PM     Asim Bravo MD at 2018  2:42 PM              [facility] Medicine Services  PROGRESS NOTE    Patient Name: Katharine Mendoza  : 1931  MRN: [id number]    Date of Admission: 2018  Length of Stay: 1  Primary Care Physician: Samia Carrington MD    Subjective   Subjective     CC:  Fall / hypoxia / respiratory distress    HPI:  Respiratory distress event from overnight.  Overnight doctor called due to patient on 80% non-rebreather.  Pt required NT suctioning.  Called about patient aspirating today.  Son in room - Josh.  Discussed concerns for no safe way to feed and provide medications.  They would like to try NG tube    Review of Systems    Gen- No fevers, chills  CV- No chest pain, palpitations  Resp- No cough, dyspnea  GI- No N/V/D, abd pain    Otherwise ROS is negative except as mentioned in the HPI.    Objective   Objective     Vital Signs:   Temp:  [97 °F (36.1 °C)-98 °F (36.7 °C)] 97.4 °F (36.3 °C)  Heart Rate:  [] 81  Resp:  [18-26] 18  BP: ()/(48-80) 92/56     Physical Exam:    Constitutional: No acute distress, awake, alert  HENT: NCAT, mucous membranes moist  Respiratory: poor inspiratory effort, rhonchi bilaterally, junky breath sounds of secretions in airways  Cardiovascular: RRR, s1 and s2  Gastrointestinal: Positive bowel sounds, soft, nontender, nondistended  Musculoskeletal: No bilateral ankle edema  Psychiatric: Appropriate affect, cooperative  Neurologic: Oriented x 3, strength symmetric in all extremities, Cranial Nerves grossly intact to confrontation, speech clear  Skin: No rashes    Results Reviewed:  I have personally reviewed current lab, radiology, and data and agree.      Results from last 7 days  Lab Units 18  3676  11/08/18  0338 11/07/18  1854 11/07/18  1232   WBC 10*3/mm3  --  6.14  --  9.07   HEMOGLOBIN g/dL  --  10.6*  --  11.9   HEMATOCRIT %  --  33.1*  --  36.8   PLATELETS 10*3/mm3  --  145*  --  154   INR  1.09  --  1.09 1.08       Results from last 7 days  Lab Units 11/08/18  0338 11/07/18  1232   SODIUM mmol/L 136 132   POTASSIUM mmol/L 3.8 4.0   CHLORIDE mmol/L 103 99   CO2 mmol/L 25.0 25.0   BUN mg/dL 24* 23   CREATININE mg/dL 0.78 0.84   GLUCOSE mg/dL 97 114*   CALCIUM mg/dL 7.6* 8.2*   ALT (SGPT) U/L  --  23   AST (SGOT) U/L  --  34*   TROPONIN I ng/mL  --  0.203*     Estimated Creatinine Clearance: 42.1 mL/min (by C-G formula based on SCr of 0.78 mg/dL).  BNP   Date Value Ref Range Status   11/07/2018 807.0 (H) 0.0 - 100.0 pg/mL Final     Comment:     Results may be falsely decreased if patient taking Biotin.       Microbiology Results Abnormal     Procedure Component Value - Date/Time    Blood Culture - Blood, [[id number]]  (Normal) Collected:  11/07/18 1225    Lab Status:  Preliminary result Specimen:  Blood from Wrist, Right Updated:  11/08/18 1300     Blood Culture No growth at 24 hours    Blood Culture - Blood, [[id number]]  (Normal) Collected:  11/07/18 1210    Lab Status:  Preliminary result Specimen:  Blood from Arm, Right Updated:  11/08/18 1300     Blood Culture No growth at 24 hours    Blood Culture - Blood, [[id number]]  (Normal) Collected:  11/07/18 1946    Lab Status:  Preliminary result Specimen:  Blood from Hand, Left Updated:  11/08/18 0800     Blood Culture No growth at less than 24 hours    Blood Culture - Blood, [[id number]]  (Normal) Collected:  11/07/18 1946    Lab Status:  Preliminary result Specimen:  Blood from Arm, Right Updated:  11/08/18 0800     Blood Culture No growth at less than 24 hours    Influenza A & B, RT PCR - Swab, Nasopharynx [[id number]]  (Normal) Collected:  11/07/18 1326    Lab Status:  Final result Specimen:  Swab from Nasopharynx Updated:  11/07/18 1436     Influenza A PCR  Not Detected     Influenza B PCR Not Detected    Influenza A & B, RT PCR - Swab, Nasopharynx [[id number]] Collected:  11/07/18 1239    Lab Status:  Edited Result - FINAL Specimen:  Swab from Nasopharynx Updated:  11/07/18 1304     Influenza A PCR --     Comment: Wrong swab submitted. Recollect requested.   Corrected result. Previous result was Not Detected on 11/7/2018 at 1259 EST.        Influenza B PCR --     Comment: Wrong swab submitted. Recollect requested.   Corrected result. Previous result was Not Detected on 11/7/2018 at 1259 EST.             Imaging Results (last 24 hours)     Procedure Component Value Units Date/Time    FL Video Swallow With Speech [[id number]] Collected:  11/08/18 1416     Updated:  11/08/18 1416    Narrative:       EXAMINATION: FL VIDEO SWALLOW W SPEECH-     INDICATION: DYSPHAGIA, OROPHARYNGEAL, HAS ATTRIBUTABLE CAUSE     TECHNIQUE: 2 minutes of fluoroscopic time was used for this exam. 1  associated image was saved. The patient was evaluated in the seated  lateral position while taking a variety of consistencies of barium by  mouth under the direction of speech pathology.     COMPARISON: NONE     FINDINGS: There was aspiration during sips of thin, nectar consistency  barium. There was a moderate amount of residue in the vallecula, and  performed sinuses after swallows with all media attempted. Residue  resulted in eventual aspiration.          Impression:       Fluoroscopy provided for a modified barium swallow. Please  see speech therapy report for full details and recommendations.           XR Chest 1 View [[id number]] Collected:  11/07/18 2145     Updated:  11/07/18 2309    Narrative:       EXAM:    XR Chest, 1 View     EXAM DATE/TIME:    11/7/2018 9:45 PM     CLINICAL HISTORY:    87 years old, female; Lobar pneumonia, unspecified organism; Hypoxemia;   Shortness of breath.     TECHNIQUE:    XR of the chest, 1 view.     COMPARISON:    CR XR CHEST 1 VW 11/7/2018 1:06 PM     FINDINGS:      Stable cardiomegaly with aortic atherosclerosis. Stable right basilar   airspace consolidation. Possible trace left pleural effusion. No pneumothorax.     No acute osseous abnormality.       Impression:       Stable right basilar airspace consolidation. Possible trace left pleural   effusion.     THIS DOCUMENT HAS BEEN ELECTRONICALLY SIGNED BY DEANA PORTER MD        Results for orders placed during the hospital encounter of 11/07/18   Adult Transthoracic Echo Complete W/ Cont if Necessary Per Protocol    Narrative · Mild to moderate aortic valve regurgitation is present.  · Mild-to-moderate mitral valve regurgitation is present.  · Mild tricuspid valve regurgitation is present.  · Left ventricular wall thickness is consistent with mild concentric   hypertrophy.  · Left ventricular systolic function is normal. Estimated EF = 70%.  · There is no evidence of pericardial effusion.  · No evidence of pulmonary hypertension is present.  · The aortic valve exhibits sclerosis.  · Moderate MAC is present.  · Normal right ventricular cavity size, wall thickness, systolic function   and septal motion noted.          I have reviewed the medications.      aspirin 81 mg Oral Daily   atorvastatin 10 mg Oral Nightly   azithromycin 250 mg Intravenous Q24H   cholecalciferol 1,000 Units Oral Daily   donepezil 10 mg Oral Nightly   levothyroxine 88 mcg Oral Once per day on Mon Tue Wed Thu Fri Sat   melatonin 2.5 mg Sublingual Nightly   multivitamin with minerals 1 tablet Oral Daily   piperacillin-tazobactam 3.375 g Intravenous Q8H   sodium chloride 3 mL Intravenous Q12H     Assessment/Plan   Assessment / Plan     Active Hospital Problems    Diagnosis   • **Possible Aspiration pneumonia (CMS/HCC), right lower lobe   • Accident due to mechanical fall without injury   • Hypotension   • New onset Atrial fibrillation with rapid ventricular response (CMS/HCC)   • Elevated troponin   • Acute respiratory failure with hypoxia (CMS/HCC)   • Late  onset Alzheimer's disease without behavioral disturbance   • Community acquired pneumonia of right lower lobe of lung (CMS/HCC)     Brief Hospital Course to date:  Katharine Mendoza is a 87 y.o. female from a nursing home who presented after fall.    Assessment & Plan:    Fall  - fall precautions  Aspiration  - place NG tube today to protect airways  - she had respiratory event overnight and needed suctioning  Acute Hypoxic Respiratory  - appears to have aspiration  - NPO due to respiratory event overnight  Elevated Troponin  - no need to trend  - not a candidate for intervention  New Onset A Fib RVR  - high fall risk  - no anticoagulation  Dementia  - unclear how severe  Weakness    NPO due to respiratory distress and aspiration  NG tube today    DVT Prophylaxis:  Heparin SC    CODE STATUS:   Code Status and Medical Interventions:   Ordered at: 11/07/18 1013     Limited Support to NOT Include:    Intubation     Code Status:    CPR     Medical Interventions (Level of Support Prior to Arrest):    Limited     Comments:    Discussed with patient's son who wanted full code currently but plans to review her living will and bring it to the hospital     Disposition: I expect the patient to be discharged TBD    Electronically signed by Asim Bravo MD, 11/08/18, 2:42 PM.      Electronically signed by Asim Bravo MD at 11/8/2018  2:56 PM          Consult Notes (all)      Chester Barreto MD at 11/8/2018  9:56 AM      Consult Orders    1. Inpatient Cardiology Consult [[id number]] ordered by Nayan Alvarado MD at 11/07/18 1622                          [facility] - Cardiology Consult    Katharine Mendoza  8/17/1931  S485/1    Admit Date:  11/7/2018      PCP:  Samia Carrington MD Req MD:  Dr. aNyan Alvarado - Hospitalist  Consulting MD:  Dr. Chester Barreto - Cardiologist    11/08/18    CC:  Fall out of Bed, Weakness, PNA - aspiration vs CAP  Reason for Consult:   Atrial Fibrillation  with RVR, elevated troponin    PROBLEM LIST/PMHx:  1. New onset Atrial fibrillation with rapid ventricular response (CMS/HCC)   A.  CHADS2 Vasc = 2.  Fall risk for anti coagulation.     B.  Echo 11/7/18, Dr. Navarro:  EF 70%, mild-mod AR/MR/mild TR.  Moderate MAC present.  2.  Elevated Troponin   A.  Asymptomatic   B.  In setting of fall, weakness, PNA  3.  Possible Aspiration pneumonia (CMS/HCC), right lower lobe  4.  Accident due to mechanical fall without injury  5.  Hypotension, resolved.  6.  Acute respiratory failure with hypoxia (CMS/HCC)  7.  Late onset Alzheimer's disease without behavioral disturbance  8.  Osteopenia  9.  Dyslipidemia  10.  Thyroid disorder, on chronic supplementation      Allergies:  has No Known Allergies.    Prescriptions Prior to Admission   Medication Sig Dispense Refill Last Dose   • alendronate (FOSAMAX) 70 MG tablet Take 70 mg by mouth Every 7 (Seven) Days. Tuesday   10/30/2018   • aspirin 81 MG EC tablet Take 81 mg by mouth Daily.   11/6/2018 at Unknown time   • atorvastatin (LIPITOR) 10 MG tablet Take 10 mg by mouth Every Night.   11/6/2018 at Unknown time   • cholecalciferol (VITAMIN D3) 1000 units tablet Take 1,000 Units by mouth Daily.   11/6/2018 at Unknown time   • diazePAM (VALIUM) 2 MG tablet Take 2 mg by mouth 3 (Three) Times a Day As Needed for Anxiety.   11/5/2018 at Unknown time   • donepezil (ARICEPT) 10 MG tablet Take 10 mg by mouth Every Night.   11/6/2018 at Unknown time   • levothyroxine (SYNTHROID, LEVOTHROID) 88 MCG tablet Take 88 mcg by mouth See Admin Instructions. Take daily EXCEPT on Sunday 11/6/2018 at Unknown time   • Multiple Vitamins-Minerals (PRESERVISION AREDS 2 PO) Take 1 capsule by mouth 2 (Two) Times a Day.   11/6/2018 at Unknown time       Current Hospital Scheduled Meds:  aspirin 81 mg Oral Daily   atorvastatin 10 mg Oral Nightly   ceftriaxone 1 g Intravenous Q24H   And      azithromycin 250 mg Intravenous Q24H   cholecalciferol 1,000 Units Oral  "Daily   donepezil 10 mg Oral Nightly   levothyroxine 88 mcg Oral Once per day on Mon Tue Wed Thu Fri Sat   melatonin 2.5 mg Sublingual Nightly   multivitamin with minerals 1 tablet Oral Daily   sodium chloride 3 mL Intravenous Q12H     Continuous Infusions:  heparin 12 Units/kg/hr Last Rate: 12 Units/kg/hr (11/08/18 0734)   Pharmacy to Dose Heparin       PRN Meds:  •  acetaminophen  •  albuterol  •  diazePAM  •  docusate sodium  •  glycopyrrolate  •  ondansetron  •  Pharmacy to Dose Heparin  •  sodium chloride        CARDIAC RISK FACTORS:   advanced age (older than 55 for men, 65 for women) and dyslipidemia.         HPI:  Katharine Mendoza is an 88 yo CF, resident of [facility].  She lives in an single apartment/room there and has been there for a few years.  She uses a rolling walker for ambulatory assistance and participates in activities and attends all meals.    She was in bed and rolled over at which time she fell out of bed and could not get up.  She did not hit her head or injure herself.  She denied syncope, denied dizziness or lightheadedness.  When help arrived, her HR was noted to be elevated and she appeared more weak than usual.  She was brought via EMS to [facility] ED.  Upon arrival, she was found to be in Atrial Fibrillation with \"tachycardic rate\"(documented 93bpm).  She was hypotensive with LGF 99.3.  She also complained of a cough - CXR indicated pneumonia.  There is some concern about aspiration vs community acquired.  Ms. Mendoza was admitted to the on call hospitalist.  Cardiology has been asked to see this patient in consultation for AFib and an elevated troponin of 0.2.  She has since converted to NSR on her own.  She was unaware of the Afib, denying palpitations/dizziness/skipped beats.  She denies chest pain or dyspnea otherwise.  She has been told previously that she has \"some valve\" disease but that nothing needed to be done with it.  This was based on an echo ~ 2 years " "ago at [facility] according to the son, who is present at time of consultation.    She does have moderated dementia and has done well at the nursing home.  There have been no cardiac issues or complaints.  She has taken low dose ASA and statin for several years. She does not smoke/chew tobacco, does not consume alcohol, does not consume excessive caffeine.              Social History     Social History   • Marital status:      Spouse name: N/A   • Number of children: N/A   • Years of education: N/A     Occupational History   • Not on file.     Social History Main Topics   • Smoking status: Never Smoker   • Smokeless tobacco: Never Used   • Alcohol use No   • Drug use: No   • Sexual activity: Not on file     Other Topics Concern   • Not on file     Social History Narrative    Lives in assisted living.       History reviewed. No pertinent family history.  History reviewed. No pertinent surgical history.  ROS: Pertinent items are noted in HPI, all other systems reviewed and negative     Objective     height is 154.9 cm (61\") and weight is 62.9 kg (138 lb 9.6 oz). Her oral temperature is 97 °F (36.1 °C). Her blood pressure is 139/68 and her pulse is 70. Her respiration is 18 and oxygen saturation is 92%.    Intake/Output Summary (Last 24 hours) at 11/08/18 0956  Last data filed at 11/08/18 0503   Gross per 24 hour   Intake            356.8 ml   Output                0 ml   Net            356.8 ml         Physical Exam:  General Appearance:    Alert, cooperative, in no acute distress   Head:    Normocephalic, without obvious abnormality, atraumatic   Eyes:            Lids and lashes normal, conjunctivae and sclerae normal, no   icterus, no pallor, corneas clear, PERRLA. + Glasses   Ears:    Ears appear intact with no abnormalities noted   Throat:   No oral lesions, no thrush, oral mucosa moist   Neck:   No adenopathy, supple, trachea midline, no thyromegaly, no     carotid bruit, no JVD   Back:     No kyphosis " present, no scoliosis present, no skin lesions,       erythema or scars, no tenderness to percussion or                   palpation,   range of motion normal   Lungs:     Course breath sounds L>R, few ronchi/no wheezes,respirations regular, even and unlabored    Heart:    Regular rhythm and normal rate, normal S1 and S2, no            murmur, no gallop, no rub, no click       Abdomen:     Normal bowel sounds, no masses, no organomegaly, soft        non-tender, non-distended, no guarding, no rebound                 tenderness       Extremities:   Moves all extremities well,  no cyanosis, no redness, no edema   Pulses:   Pulses palpable and equal bilaterally   Skin:   No bleeding, bruising or rash   Lymph nodes:   No palpable adenopathy   Neurologic:   Cranial nerves 2 - 12 grossly intact, sensation intact, DTR        present and equal bilaterally      I have examined the patient and agree with the above    Results Review:  I personally reviewed the patient's clinical results.    Results from last 7 days  Lab Units 18  0338   WBC 10*3/mm3 6.14   HEMOGLOBIN g/dL 10.6*   HEMATOCRIT % 33.1*   PLATELETS 10*3/mm3 145*       Results from last 7 days  Lab Units 18  0338 18  1232   SODIUM mmol/L 136 132   POTASSIUM mmol/L 3.8 4.0   CHLORIDE mmol/L 103 99   CO2 mmol/L 25.0 25.0   BUN mg/dL 24* 23   CREATININE mg/dL 0.78 0.84   CALCIUM mg/dL 7.6* 8.2*   BILIRUBIN mg/dL  --  0.6   ALK PHOS U/L  --  74   ALT (SGPT) U/L  --  23   AST (SGOT) U/L  --  34*   GLUCOSE mg/dL 97 114*       A1C:  5.8  M    Troponin x1:  0.203    BNP:  807        Results from last 7 days  Lab Units 18  0339 18  1854 18  1232   INR  1.09 1.09 1.08           Results from last 7 days  Lab Units 18  0338   CHOLESTEROL mg/dL 83   TRIGLYCERIDES mg/dL 47   HDL CHOL mg/dL 36*   LDL CHOL mg/dL 39           Radiology:  Imaging Results (last 72 hours)     Procedure Component Value Units Date/Time    XR Chest 1 View  [[id number]] Collected:  11/07/18 2145     Updated:  11/07/18 2309    Narrative:       EXAM:    XR Chest, 1 View     EXAM DATE/TIME:    11/7/2018 9:45 PM     CLINICAL HISTORY:    87 years old, female; Lobar pneumonia, unspecified organism; Hypoxemia;   Shortness of breath.     TECHNIQUE:    XR of the chest, 1 view.     COMPARISON:    CR XR CHEST 1 VW 11/7/2018 1:06 PM     FINDINGS:     Stable cardiomegaly with aortic atherosclerosis. Stable right basilar   airspace consolidation. Possible trace left pleural effusion. No pneumothorax.     No acute osseous abnormality.       Impression:       Stable right basilar airspace consolidation. Possible trace left pleural   effusion.     THIS DOCUMENT HAS BEEN ELECTRONICALLY SIGNED BY DEANA PORTER MD    XR Chest 1 View [[id number]] Collected:  11/07/18 1408     Updated:  11/07/18 1431    Narrative:       EXAMINATION: XR CHEST 1 VW- 11/07/2018     INDICATION: cough, hypoxemia, L lung wheezes     TECHNIQUE:  Single view frontal chest.     COMPARISONS: 08/17/2010     FINDINGS:  Calcified granulomata are seen in the left lung. There is  opacity in the periphery of the right lower lobe. No sizable effusion.  No pneumothorax. Atherosclerosis and tortuosity of the aorta. Prominent  cardiopericardial silhouette.       Impression:       Right lower lobe airspace disease which could represent  pneumonia.     D:  11/07/2018  E:  11/07/2018     This report was finalized on 11/7/2018 2:28 PM by Darrel Ribeiro.               Tele:  Atrial Fibrillation by EKG at admission.  Appears to be in NSR at time of consultation.    Assessment/Plan     1.  New Onset Atrial Fibrillation   -  Slight RVR in setting of weakness, fall. Remote episode with self resolution.   -  Maintaining NSR with rate control.  At this time, would DC IV Heparin.  Would continue to monitor, hydrate.  Patient is asymptomatic with no previous documentation or history of AFib.  If happens again, would add Lopressor for rate control  but at this time would make no medication changes.     -  CHADS2 Vasc = 2.  High fall risk as this is what prompted admission.  Would continue with home dose of 81mg ASA daily.  Given her high fall risk, would not recommend anticoagulation.   -  Echo performed and reviewed.  Normal EF.  Valvular disease noted - acceptable for age.   -  Will check thyroid functions.   -  Will obtain EKG today to document NSR.  At this time, Cardiology will see PRN.  Please call if needed beyond our recommendations noted above.    2.  Hypotension   -  Improved.  Monitor.   -  Hydration of importance to maintain.  Patient is NPO for dysphagia evaluation.  Will start maintenance fluids.    3.  Elevated Troponin   -  Only one lab drawn.  Serial labs not necessary   -  Patient is asymptomatic.  EKG performed and reviewed.  Echo essentially WNL without wall motion abnormalities.  Valvular disease appropriate for age.  Patient not candidate for aggressive interventional measures   -  Would continue with home doses of ASA and statin.    4.  Generalized weakness with fall   -  PT/OT.   -  Safety precautions, monitoring.   -  May need guard rails on bed at time of DC -  can assist.   -  No urine checked at time of admission.  Would check to cover basics.     5.  Pneumonia, possible aspiration   -  Started on abx per hospitalists   -  Afebrile and without leukocytosis.   -  Swallow evaluation today.    6.  Dyslipidemia   -  Lipid panel personally reviewed.   -  Continue home dose of statin and appropriate diet.    7.  Thyroid Disorder   -  Continue chronic supplementation   -  Check labs.    I discussed the patients findings and my recommendations with the patient, any present family members, and the nursing staff.  Chester Barreto MD saw and examined patient, verified hx and PE, read all radiographic studies, reviewed labs and micro data, and formulated dx, plan for treatment and all medical decision making.      Virgen Hall,  ZENAIDA, working with Chester Barreto MD  11/08/18 9:56 AM  I, Chester Barreto have reviewed the note in full and agree with all aspects of the above including physical exam, assessment, labs and plan with changes made accordingly.     Chester Barreto MD  11/08/18  1:20 PM            Electronically signed by Chester Barreto MD at 11/8/2018  1:20 PM

## 2018-11-12 NOTE — PLAN OF CARE
Problem: Patient Care Overview  Goal: Plan of Care Review  Outcome: Ongoing (interventions implemented as appropriate)   11/12/18 5867   Coping/Psychosocial   Plan of Care Reviewed With patient   Plan of Care Review   Progress improving   OTHER   Outcome Summary Pt. demo steady progress w/ mobility; however, continues to require cues for safety. Pt. performed sit to stand transfers w/ RW and CGA, and ambulated 100ft. w/ RW and min A, cues for safety. Pt. readily fatigues and requires encouragement to progress mobility. Recommend cont IPPT per POC.

## 2018-11-13 VITALS
SYSTOLIC BLOOD PRESSURE: 110 MMHG | HEIGHT: 61 IN | TEMPERATURE: 97.6 F | OXYGEN SATURATION: 96 % | WEIGHT: 138.5 LBS | BODY MASS INDEX: 26.15 KG/M2 | RESPIRATION RATE: 18 BRPM | DIASTOLIC BLOOD PRESSURE: 60 MMHG | HEART RATE: 103 BPM

## 2018-11-13 PROCEDURE — 99239 HOSP IP/OBS DSCHRG MGMT >30: CPT | Performed by: NURSE PRACTITIONER

## 2018-11-13 PROCEDURE — 25010000002 HEPARIN (PORCINE) PER 1000 UNITS: Performed by: HOSPITALIST

## 2018-11-13 RX ORDER — AMOXICILLIN AND CLAVULANATE POTASSIUM 875; 125 MG/1; MG/1
1 TABLET, FILM COATED ORAL EVERY 12 HOURS SCHEDULED
Qty: 4 TABLET | Refills: 0 | Status: SHIPPED | OUTPATIENT
Start: 2018-11-13 | End: 2018-11-15

## 2018-11-13 RX ORDER — AMOXICILLIN AND CLAVULANATE POTASSIUM 875; 125 MG/1; MG/1
1 TABLET, FILM COATED ORAL EVERY 12 HOURS SCHEDULED
Qty: 4 TABLET | Refills: 0
Start: 2018-11-13 | End: 2018-11-13

## 2018-11-13 RX ORDER — DIAZEPAM 2 MG/1
2 TABLET ORAL 3 TIMES DAILY PRN
Qty: 9 TABLET | Refills: 0 | Status: SHIPPED | OUTPATIENT
Start: 2018-11-13

## 2018-11-13 RX ADMIN — VITAMIN D, TAB 1000IU (100/BT) 1000 UNITS: 25 TAB at 08:25

## 2018-11-13 RX ADMIN — HEPARIN SODIUM 5000 UNITS: 5000 INJECTION, SOLUTION INTRAVENOUS; SUBCUTANEOUS at 05:29

## 2018-11-13 RX ADMIN — ASPIRIN 81 MG: 81 TABLET, COATED ORAL at 08:25

## 2018-11-13 RX ADMIN — AMOXICILLIN AND CLAVULANATE POTASSIUM 1 TABLET: 875; 125 TABLET, FILM COATED ORAL at 08:25

## 2018-11-13 RX ADMIN — MULTIPLE VITAMINS W/ MINERALS TAB 1 TABLET: TAB ORAL at 08:24

## 2018-11-13 RX ADMIN — LEVOTHYROXINE SODIUM 88 MCG: 0.09 TABLET ORAL at 05:29

## 2018-11-13 RX ADMIN — Medication 3 ML: at 08:25

## 2018-11-13 NOTE — PLAN OF CARE
Problem: Patient Care Overview  Goal: Plan of Care Review  Outcome: Ongoing (interventions implemented as appropriate)   11/13/18 0357   Coping/Psychosocial   Plan of Care Reviewed With patient   Plan of Care Review   Progress no change       Problem: Skin Injury Risk (Adult)  Goal: Skin Health and Integrity  Outcome: Ongoing (interventions implemented as appropriate)      Problem: Pneumonia (Adult)  Goal: Signs and Symptoms of Listed Potential Problems Will be Absent, Minimized or Managed (Pneumonia)  Outcome: Ongoing (interventions implemented as appropriate)

## 2018-11-13 NOTE — DISCHARGE PLACEMENT REQUEST
"Jose Manuel Waldrop (87 y.o. Female)     Date of Birth Social Security Number Address Home Phone MRN    [id number]  [facility] [address] [phone] [id number]    Confucianist Marital Status          Taoism        Admission Date Admission Type Admitting Provider Attending Provider Department, Room/Bed    18 Emergency Nayan Alvarado MD Furlow, Stephen Matthew, MD [address], [address]/1    Discharge Date Discharge Disposition Discharge Destination         Skilled Nursing Facility (DC - External)              Attending Provider:  Nayan Alvarado MD    Allergies:  No Known Allergies    Isolation:  None   Infection:  None   Code Status:  CPR    Ht:  154.9 cm (61\")   Wt:  62.8 kg (138 lb 8 oz)    Admission Cmt:  None   Principal Problem:  Possible Aspiration pneumonia (CMS/HCC), right lower lobe [J69.0]                 Active Insurance as of 2018     Primary Coverage     Payor Plan Insurance Group Employer/Plan Group    ANTH MEDICARE REPLACEMENT ANTH MEDICARE ADVANTAGE [id number]     Payor Plan Address Payor Plan Phone Number Payor Plan Fax Number Effective Dates    [address] [phone]  2016 - None Entered    [facility] [address]       Subscriber Name Subscriber Birth Date Member ID       JOSE MANUEL WALDROP 1931 [id number]                 Emergency Contacts      (Rel.) Home Phone Work Phone Mobile Phone    Benson Waldrop (Power of ) [phone] -- --               Discharge Summary      Komal Rodríguez APRN at 2018 11:01 AM              [facility] Medicine Services  DISCHARGE SUMMARY    Patient Name: Jose Manuel Waldrop  : 1931  MRN: [id number]    Date of Admission: 2018  Date of Discharge:  2018  Primary Care Physician: Samia Carrington MD    Consults     Date and Time Order Name Status Description    2018 1831 Inpatient Cardiology Consult " Completed         Hospital Course     Presenting Problem:   Community acquired pneumonia of right lower lobe of lung (CMS/HCC) [J18.1]    Active Hospital Problems    Diagnosis Date Noted   • **Possible Aspiration pneumonia (CMS/HCC), right lower lobe [J69.0] 11/07/2018   • Accident due to mechanical fall without injury [W19.XXXA] 11/07/2018   • Hypotension [I95.9] 11/07/2018   • New onset Atrial fibrillation with rapid ventricular response (CMS/HCC) [I48.91] 11/07/2018   • Elevated troponin [R74.8] 11/07/2018   • Acute respiratory failure with hypoxia (CMS/HCC) [J96.01] 11/07/2018   • Late onset Alzheimer's disease without behavioral disturbance [G30.1, F02.80] 11/07/2018   • Community acquired pneumonia of right lower lobe of lung (CMS/HCC) [J18.1] 11/07/2018      Resolved Hospital Problems   No resolved problems to display.          Hospital Course:  Katharine Mendoza is a 87 y.o. female who lives in an assisted living facility.  She has a history of dementia.  She presented to [facility] after a fall at her facility.  There was some concern that she was pale, tachycardic and with a cough.   In the ED, she was found to have pneumonia and was hypoxic, in atrial fibrillation  She was admitted to hospital medicine services for further evaluation and treatment.  Given her right lower lobe pneumonia, there was concern for aspiration pneumonia.  She was placed on IV antibiotics.  She was noted to have a new onset of atrial fibrillation with RVR.  Her rapid heart rate normalized.  She was placed on a heparin drip and was seen by cardiology.  She converted to sinus rhythm on her own.  Her IV heparin was discontinued and cardiology recommended hydration and monitoring, as this episode seemed to be self limited.  The patient was noted to have a CHADS2 Vasc of 2. Cardiology recommended no anticoagulation due to her high fall risk.  The patient was evaluated by speech therapy and underwent an modified barium swallow.  She was noted  to have silent aspiration and a level 2 puree diet with honey thickened liquids was recommended.  Prior to her discharge from the hospital, she was seen by speech therapy again and re-evaluated.  They recommended a soft whole diet with thin liquids via small single sips.  No straws.  Meds whole or crushed in purée.  The patient was transitioned to oral  Augmentin and is now currently on room air.  The patient is now medically stable and ready to be discharged back [facility].  Her family will transport by private vehicle.  She should follow-up with her primary care provider at their first available hospital follow-up appointment.         Day of Discharge     HPI:   Resting comfortably in bed this morning.  States that she did not sleep well last night and wants to nap now.  Per nursing staff no overnight issues.    Review of Systems  Difficult to obtain due to dementia, but overall negative.    Otherwise ROS is negative except as mentioned in the HPI.    Vital Signs:   Temp:  [97.6 °F (36.4 °C)-98.5 °F (36.9 °C)] 97.6 °F (36.4 °C)  Heart Rate:  [] 103  Resp:  [18-20] 18  BP: (110-128)/(52-71) 110/60     Physical Exam:  Constitutional: No acute distress, awake, alert, resting in bed, no family at bedside.   HENT: NCAT, mucous membranes moist  Respiratory: Clear to auscultation bilaterally, respiratory effort normal on room air  Cardiovascular: RRR, no murmurs, rubs, or gallops, palpable pedal pulses bilaterally  Gastrointestinal: Positive bowel sounds, soft, nontender, nondistended  Musculoskeletal: No bilateral ankle edema  Psychiatric: Appropriate affect, cooperative  Neurologic: Oriented x 2, strength symmetric in all extremities, Cranial Nerves grossly intact to confrontation, speech clear  Skin: No rashes      Pertinent  and/or Most Recent Results     Results from last 7 days   Lab Units  11/08/18   0338  11/07/18   1232   WBC 10*3/mm3  6.14  9.07   HEMOGLOBIN g/dL   10.6*  11.9   HEMATOCRIT %  33.1*  36.8   PLATELETS 10*3/mm3  145*  154   SODIUM mmol/L  136  132   POTASSIUM mmol/L  3.8  4.0   CHLORIDE mmol/L  103  99   CO2 mmol/L  25.0  25.0   BUN mg/dL  24*  23   CREATININE mg/dL  0.78  0.84   GLUCOSE mg/dL  97  114*   CALCIUM mg/dL  7.6*  8.2*     Results from last 7 days   Lab Units  11/08/18   0339  11/07/18   1854  11/07/18   1232   BILIRUBIN mg/dL   --    --   0.6   ALK PHOS U/L   --    --   74   ALT (SGPT) U/L   --    --   23   AST (SGOT) U/L   --    --   34*   PROTIME Seconds  11.4  11.4  11.3   INR   1.09  1.09  1.08   APTT seconds  40.5*  110.4*  93.5*  29.9     Results from last 7 days   Lab Units  11/08/18   0338   CHOLESTEROL mg/dL  83   TRIGLYCERIDES mg/dL  47   HDL CHOL mg/dL  36*     Results from last 7 days   Lab Units  11/08/18   0338  11/07/18   1232   HEMOGLOBIN A1C %  5.80*   --    BNP pg/mL   --   807.0*   TROPONIN I ng/mL   --   0.203*     Brief Urine Lab Results  (Last result in the past 365 days)      Color   Clarity   Blood   Leuk Est   Nitrite   Protein   CREAT   Urine HCG        11/11/18 0901 Dark Yellow Turbid Moderate (2+) Small (1+) Negative 30 mg/dL (1+)               Microbiology Results Abnormal     Procedure Component Value - Date/Time    Blood Culture - Blood, [[id number]] Collected:  11/07/18 1946    Lab Status:  Final result Specimen:  Blood from Hand, Left Updated:  11/12/18 2000     Blood Culture No growth at 5 days    Blood Culture - Blood, [[id number]] Collected:  11/07/18 1946    Lab Status:  Final result Specimen:  Blood from Arm, Right Updated:  11/12/18 2000     Blood Culture No growth at 5 days    Blood Culture - Blood, [[id number]] Collected:  11/07/18 1225    Lab Status:  Final result Specimen:  Blood from Wrist, Right Updated:  11/12/18 1300     Blood Culture No growth at 5 days    Blood Culture - Blood, [[id number]] Collected:  11/07/18 1210    Lab Status:  Final result Specimen:  Blood from Arm, Right Updated:  11/12/18 1300      Blood Culture No growth at 5 days    Respiratory Culture - Sputum, Cough [[id number]] Collected:  11/08/18 1007    Lab Status:  Final result Specimen:  Sputum from Cough Updated:  11/10/18 0654     Respiratory Culture Moderate growth (3+) Normal Respiratory Roseann     Gram Stain Many (4+) WBCs per low power field      Moderate (3+) Epithelial cells per low power field      Moderate (3+) Gram positive cocci in pairs      Few (2+) Gram positive bacilli      Occasional Gram negative bacilli    Influenza A & B, RT PCR - Swab, Nasopharynx [[id number]]  (Normal) Collected:  11/07/18 1326    Lab Status:  Final result Specimen:  Swab from Nasopharynx Updated:  11/07/18 1436     Influenza A PCR Not Detected     Influenza B PCR Not Detected    Influenza A & B, RT PCR - Swab, Nasopharynx [[id number]] Collected:  11/07/18 1239    Lab Status:  Edited Result - FINAL Specimen:  Swab from Nasopharynx Updated:  11/07/18 1304     Influenza A PCR --     Comment: Wrong swab submitted. Recollect requested.   Corrected result. Previous result was Not Detected on 11/7/2018 at 1259 EST.        Influenza B PCR --     Comment: Wrong swab submitted. Recollect requested.   Corrected result. Previous result was Not Detected on 11/7/2018 at 1259 EST.             Imaging Results (all)     Procedure Component Value Units Date/Time    FL Video Swallow With Speech [[id number]] Collected:  11/12/18 1414     Updated:  11/12/18 1508    Narrative:       EXAMINATION: FL VIDEO SWALLOW W SPEECH-     INDICATION: dysphagia; J18.1-Lobar pneumonia, unspecified organism;  R09.02-Hypoxemia; J96.01-Acute respiratory failure with hypoxia;  R13.12-Dysphagia, oropharyngeal phase; Z74.09-Other reduced mobility;  Z74.09-Other reduced mobility     TECHNIQUE: 42 seconds of fluoroscopic time was used for this exam. 1  associated image was saved. The patient was evaluated in the seated  lateral position while taking a variety of consistencies of barium by  mouth under the  direction of speech pathology.     COMPARISON: NONE     FINDINGS: There was aspiration with sips of thin barium from a straw.  There was no penetration and no aspiration with single sips of thin  barium from a cup, pudding, or solid consistency barium.          Impression:       Fluoroscopy provided for a modified barium swallow. Please  see speech therapy report for full details and recommendations.         This report was finalized on 11/12/2018 3:06 PM by Darrel Ribeiro.       FL Video Swallow With Speech [[id number]] Collected:  11/08/18 1416     Updated:  11/08/18 1604    Narrative:       EXAMINATION: FL VIDEO SWALLOW W SPEECH-     INDICATION: DYSPHAGIA, OROPHARYNGEAL, HAS ATTRIBUTABLE CAUSE     TECHNIQUE: 2 minutes of fluoroscopic time was used for this exam. 1  associated image was saved. The patient was evaluated in the seated  lateral position while taking a variety of consistencies of barium by  mouth under the direction of speech pathology.     COMPARISON: NONE     FINDINGS: There was aspiration during sips of thin, nectar consistency  barium. There was a moderate amount of residue in the vallecula, and  performed sinuses after swallows with all media attempted. Residue  resulted in eventual aspiration.          Impression:       Fluoroscopy provided for a modified barium swallow. Please  see speech therapy report for full details and recommendations.         This report was finalized on 11/8/2018 4:01 PM by Dr. Brittney Alonso MD.       XR Chest 1 View [[id number]] Collected:  11/07/18 2145     Updated:  11/07/18 2309    Narrative:       EXAM:    XR Chest, 1 View     EXAM DATE/TIME:    11/7/2018 9:45 PM     CLINICAL HISTORY:    87 years old, female; Lobar pneumonia, unspecified organism; Hypoxemia;   Shortness of breath.     TECHNIQUE:    XR of the chest, 1 view.     COMPARISON:    CR XR CHEST 1 VW 11/7/2018 1:06 PM     FINDINGS:     Stable cardiomegaly with aortic atherosclerosis. Stable right basilar    airspace consolidation. Possible trace left pleural effusion. No pneumothorax.     No acute osseous abnormality.       Impression:       Stable right basilar airspace consolidation. Possible trace left pleural   effusion.     THIS DOCUMENT HAS BEEN ELECTRONICALLY SIGNED BY DEANA PORTER MD    XR Chest 1 View [[id number]] Collected:  11/07/18 1408     Updated:  11/07/18 1431    Narrative:       EXAMINATION: XR CHEST 1 VW- 11/07/2018     INDICATION: cough, hypoxemia, L lung wheezes     TECHNIQUE:  Single view frontal chest.     COMPARISONS: 08/17/2010     FINDINGS:  Calcified granulomata are seen in the left lung. There is  opacity in the periphery of the right lower lobe. No sizable effusion.  No pneumothorax. Atherosclerosis and tortuosity of the aorta. Prominent  cardiopericardial silhouette.       Impression:       Right lower lobe airspace disease which could represent  pneumonia.     D:  11/07/2018  E:  11/07/2018     This report was finalized on 11/7/2018 2:28 PM by Darrel Ribeiro.                       Results for orders placed during the hospital encounter of 11/07/18   Adult Transthoracic Echo Complete W/ Cont if Necessary Per Protocol    Narrative · Mild to moderate aortic valve regurgitation is present.  · Mild-to-moderate mitral valve regurgitation is present.  · Mild tricuspid valve regurgitation is present.  · Left ventricular wall thickness is consistent with mild concentric   hypertrophy.  · Left ventricular systolic function is normal. Estimated EF = 70%.  · There is no evidence of pericardial effusion.  · No evidence of pulmonary hypertension is present.  · The aortic valve exhibits sclerosis.  · Moderate MAC is present.  · Normal right ventricular cavity size, wall thickness, systolic function   and septal motion noted.            Discharge Details        Discharge Medications      New Medications      Instructions Start Date   amoxicillin-clavulanate 875-125 MG per tablet  Commonly known as:   AUGMENTIN   1 tablet, Oral, Every 12 Hours Scheduled      melatonin 5 MG sublingual tablet sublingual tablet   2.5 mg, Sublingual, Nightly         Continue These Medications      Instructions Start Date   alendronate 70 MG tablet  Commonly known as:  FOSAMAX   70 mg, Oral, Every 7 Days, Tuesday      aspirin 81 MG EC tablet   81 mg, Oral, Daily      atorvastatin 10 MG tablet  Commonly known as:  LIPITOR   10 mg, Oral, Nightly      cholecalciferol 1000 units tablet  Commonly known as:  VITAMIN D3   1,000 Units, Oral, Daily      diazePAM 2 MG tablet  Commonly known as:  VALIUM   2 mg, Oral, 3 Times Daily PRN      donepezil 10 MG tablet  Commonly known as:  ARICEPT   10 mg, Oral, Nightly      levothyroxine 88 MCG tablet  Commonly known as:  SYNTHROID, LEVOTHROID   88 mcg, Oral, See Admin Instructions, Take daily EXCEPT on Sunday      PRESERVISION AREDS 2 PO   1 capsule, Oral, 2 Times Daily               Discharge Disposition:  Skilled Nursing Facility (DC - External)    Discharge Diet:  Diet Instructions     Diet: Soft Texture, Cardiac; Thin Liquids, No Restrictions; Whole      Discharge Diet:   Soft Texture  Cardiac       Fluid Consistency:  Thin Liquids, No Restrictions    Soft Options:  Whole        MBS/VFSS/FEES    Reason for Referral  Patient was referred for a MBS to assess the efficiency of his/her swallow function, rule out aspiration and make recommendations regarding safe dietary consistencies, effective compensatory strategies, and safe eating environment.                   Recommendations/Treatment  Oral Prep Phase: impaired oral phase of swallowing  Oral Transit Phase: impaired  Oral Residue: impaired              SLP Swallowing Diagnosis: mod-severe, oral dysfunction, pharyngeal dysfunction  Functional Impact: risk of aspiration/pneumonia, risk of malnutrition, risk of dehydration  Rehab Potential/Prognosis, Swallowing: adequate, monitor progress closely  Swallow Criteria for Skilled Therapeutic  Interventions Met: demonstrates skilled criteria    Therapy Frequency (Swallow): 5 days per week  Predicted Duration Therapy Intervention (Days): until discharge  SLP Diet Recommendation: puree, honey thick liquids  Recommended Diagnostics: reassess via VFSS (MBS), other (see comments) (when indicated by SLP)  Recommended Precautions and Strategies: upright posture during/after eating, small bites of food and sips of liquid, no straw, multiple swallows per bite of food, multiple swallows per sip of liquid, other (see comments) (general aspiration precautions; oral care BID/PRN)  SLP Rec. for Method of Medication Administration: meds crushed, with pudding or applesauce  Monitor for Signs of Aspiration: yes, notify SLP if any concerns  Anticipated Dischage Disposition: unknown, anticipate therapy at next level of care      Oral Preparation/ Oral Phase  Oral Prep Phase: impaired oral phase of swallowing  Oral Holding: all consistencies tested, secondary to impaired cognitive status  Prolonged Manipulation: all consistencies tested, secondary to reduced lingual strength, secondary to reduced lingual range of motion, secondary to impaired cognitive status  Oral Preparatory Phase, Comment: Prolonged manipulation ? w/ solids. Oral holding t/o eval.          Pharyngeal Phase  Initiation of Pharyngeal Swallow: bolus in pyriform sinuses  Pharyngeal Phase: impaired pharyngeal phase of swallowing  Penetration Before the Swallow: thin liquids, secondary to reduced back of tongue control, secondary to delayed swallow initiation or mistiming  Penetration During the Swallow: thin liquids, nectar-thick liquids, secondary to delayed swallow initiation or mistiming, secondary to reduced laryngeal elevation, secondary to reduced vestibular closure, other (see comments) (penetration didn't clear and deepened to TVFs)  Aspiration After the Swallow: thin liquids, nectar-thick liquids, secondary to residue, in laryngeal vestibule  Response  to Penetration: no response, deep  Response to Aspiration: no response, silent aspiration, could not clear subglottic material  Pharyngeal Residue: all consistencies tested, base of tongue, valleculae, pyriform sinuses, posterior pharyngeal wall, laryngeal vestibule, diffuse within pharynx, secondary to reduced base of tongue retraction, secondary to reduced posterior pharyngeal wall stripping, secondary to reduced laryngeal elevation, other (see comments) (> in valleculae and incr'd w/ solids)  Response to Residue: unable to clear residue, other (see comments) (residue reduced w/ subsequent swallows)  Attempted Compensatory Maneuvers: bolus size, bolus presentation style, alternative liquids/solids, throat clear after swallow, other (see comments) (pt w/ difficulty following commands)  Response to Attempted Compensatory Maneuvers: prevented aspiration, did not reduce residue  Pharyngeal Phase, Comment: Moderate-severe oropharyngeal dysphagia. Penetration occurred before/during the swallow w/ thins and during the swallow w/ nectar-thick liquids 2' mistiming/delay and reduced vestibular closure. Penetration did not clear and deepened w/ continued trials. Pt eventually aspirated thin and nectar-thick vestibular residue t/o exam. Aspiration was silent. No penetration/aspiration observed w/ honey-thick liquids or pudding. Moderate diffuse pharyngeal residue present w/ all consistencies (> in valleculae and incr'd w/ solids) 2' reduced base of tongue retraction, decr'd pharyngeal stripping wave, and reduced elevation. Residue reduced w/ subsequent swallows, but unable to fully clear.              Cervical Esophageal Phase              Prognosis          MBS/VFSS/FEES 11/12/18 **most recent swallowing recommendations below:    Reason for Referral  Patient was referred for a MBS to assess the efficiency of his/her swallow function, rule out aspiration and make recommendations regarding safe dietary consistencies, effective  compensatory strategies, and safe eating environment.        Referring Physician: MD Shelly          Recommendations/Treatment  Oral Prep Phase: impaired oral phase of swallowing  Oral Transit Phase: impaired  Oral Residue: impaired  VFSS Summary: SLP MBS evaluation completed. Pt presents w/ mild-moderate oropharyngeal dysphagia. Prolonged manipulation/mastication and increased A-P transit time w/ regular solid. Mild diffuse oral residue w/ all consistencies tested that typically cleared w/ spontaneous subsequent swallows. Prespill + delay to the level of the pyriform sinuses resulted in aspiration before the swallow w/ thin liquid via straw. Aspiration was eliminated w/ small, single sips of thin liquid via cup. No penetration/aspiration w/ pudding or solid. Mild vallecular residue w/ pudding and solid 2' reduced base of tongue retraction. Recommend soft, whole diet w/ thin liquids via small, single cup sips. Meds whole or crushed in puree. Follow standard aspiration precautions.    Severity Level of Dysphagia: mild-moderate dysphagia  Consistencies Aspirated/Penetrated: aspirated, thin liquids, other (see comments)(only via straw)         SLP Swallowing Diagnosis: mild-moderate, oral dysfunction, pharyngeal dysfunction  Functional Impact: risk of aspiration/pneumonia  Rehab Potential/Prognosis, Swallowing: adequate, monitor progress closely  Swallow Criteria for Skilled Therapeutic Interventions Met: demonstrates skilled criteria    Therapy Frequency (Swallow): 3 days per week  Predicted Duration Therapy Intervention (Days): until discharge  SLP Diet Recommendation: soft textures, whole, thin liquids, other (see comments)(via small single cup sips. NO straws)  Recommended Precautions and Strategies: upright posture during/after eating, small bites of food and sips of liquid, no straw  SLP Rec. for Method of Medication Administration: meds whole, meds crushed, with pudding or applesauce  Monitor for Signs of  Aspiration: yes, notify SLP if any concerns  Anticipated Dischage Disposition: unknown, anticipate therapy at next level of care      Oral Preparation/ Oral Phase  Oral Prep Phase: impaired oral phase of swallowing  Prolonged Manipulation: regular textures         Pharyngeal Phase  Initiation of Pharyngeal Swallow: bolus in pyriform sinuses  Pharyngeal Phase: impaired pharyngeal phase of swallowing  Aspiration Before the Swallow: thin liquids, secondary to reduced back of tongue control, secondary to delayed swallow initiation or mistiming, other (see comments)(only via straw)  Response to Aspiration: throat clear  Pharyngeal Residue: pudding/puree, regular textures, base of tongue, valleculae, secondary to reduced base of tongue retraction  Response to Residue: unable to clear residue  Attempted Compensatory Maneuvers: bolus size, bolus presentation style  Response to Attempted Compensatory Maneuvers: prevented aspiration             Cervical Esophageal Phase              Prognosis                              Discharge Activity:   Activity Instructions     Activity as Tolerated            Code Status/Level of Support:  Code Status and Medical Interventions:   Ordered at: 11/07/18 7566     Limited Support to NOT Include:    Intubation     Code Status:    CPR     Medical Interventions (Level of Support Prior to Arrest):    Limited     Comments:    Discussed with patient's son who wanted full code currently but plans to review her living will and bring it to the hospital       No future appointments.    Additional Instructions for the Follow-ups that You Need to Schedule     Discharge Follow-up with PCP   As directed       Currently Documented PCP:    Samia Carrington MD    PCP Phone Number:    [phone]     Follow Up Details:  first available hospital follow up appt.               Time Spent on Discharge:  45 minutes    Electronically signed by BURTON Weinberg, 11/13/18, 11:01 AM.        Electronically signed  by Komal Rodríguez APRN at 11/13/2018 11:14 AM

## 2018-11-13 NOTE — PROGRESS NOTES
Continued Stay Note  [facility]     Patient Name: Katharine Mendoza  MRN: [id number]  Today's Date: 11/13/2018    Admit Date: 11/7/2018    Discharge Plan     Row Name 11/13/18 1152       Plan    Plan  [facility]    Patient/Family in Agreement with Plan  yes    Final Discharge Disposition Code  01 - home or self-care    Final Note  Discharge summary has been faxed to [facility]. Son has been called and nurse has called report. Hard rx's written for new and achanged medications. No other needs noted at this time.         Discharge Codes    No documentation.       Expected Discharge Date and Time     Expected Discharge Date Expected Discharge Time    Nov 13, 2018             Kenna Cota RN

## 2018-11-13 NOTE — DISCHARGE SUMMARY
[facility] Medicine Services  DISCHARGE SUMMARY    Patient Name: Katharine Mendoza  : 1931  MRN: [id number]    Date of Admission: 2018  Date of Discharge:  2018  Primary Care Physician: Samia Carrington MD    Consults     Date and Time Order Name Status Description    2018 1831 Inpatient Cardiology Consult Completed         Hospital Course     Presenting Problem:   Community acquired pneumonia of right lower lobe of lung (CMS/HCC) [J18.1]    Active Hospital Problems    Diagnosis Date Noted   • **Possible Aspiration pneumonia (CMS/HCC), right lower lobe [J69.0] 2018   • Accident due to mechanical fall without injury [W19.XXXA] 2018   • Hypotension [I95.9] 2018   • New onset Atrial fibrillation with rapid ventricular response (CMS/HCC) [I48.91] 2018   • Elevated troponin [R74.8] 2018   • Acute respiratory failure with hypoxia (CMS/HCC) [J96.01] 2018   • Late onset Alzheimer's disease without behavioral disturbance [G30.1, F02.80] 2018   • Community acquired pneumonia of right lower lobe of lung (CMS/HCC) [J18.1] 2018      Resolved Hospital Problems   No resolved problems to display.          Hospital Course:  Katharine Mendoza is a 87 y.o. female who lives in an assisted living facility.  She has a history of dementia.  She presented to [facility] after a fall at her facility.  There was some concern that she was pale, tachycardic and with a cough.   In the ED, she was found to have pneumonia and was hypoxic, in atrial fibrillation  She was admitted to hospital medicine services for further evaluation and treatment.  Given her right lower lobe pneumonia, there was concern for aspiration pneumonia.  She was placed on IV antibiotics.  She was noted to have a new onset of atrial fibrillation with RVR.  Her rapid heart rate normalized.  She was placed on a heparin drip and was seen by cardiology.  She converted to sinus rhythm on  her own.  Her IV heparin was discontinued and cardiology recommended hydration and monitoring, as this episode seemed to be self limited.  The patient was noted to have a CHADS2 Vasc of 2. Cardiology recommended no anticoagulation due to her high fall risk.  The patient was evaluated by speech therapy and underwent an modified barium swallow.  She was noted to have silent aspiration and a level 2 puree diet with honey thickened liquids was recommended.  Prior to her discharge from the hospital, she was seen by speech therapy again and re-evaluated.  They recommended a soft whole diet with thin liquids via small single sips.  No straws.  Meds whole or crushed in purée.  The patient was transitioned to oral  Augmentin and is now currently on room air.  The patient is now medically stable and ready to be discharged back to her assisted living facility at [facility].  Her family will transport by private vehicle.  She should follow-up with her primary care provider at their first available hospital follow-up appointment.         Day of Discharge     HPI:   Resting comfortably in bed this morning.  States that she did not sleep well last night and wants to nap now.  Per nursing staff no overnight issues.    Review of Systems  Difficult to obtain due to dementia, but overall negative.    Otherwise ROS is negative except as mentioned in the HPI.    Vital Signs:   Temp:  [97.6 °F (36.4 °C)-98.5 °F (36.9 °C)] 97.6 °F (36.4 °C)  Heart Rate:  [] 103  Resp:  [18-20] 18  BP: (110-128)/(52-71) 110/60     Physical Exam:  Constitutional: No acute distress, awake, alert, resting in bed, no family at bedside.   HENT: NCAT, mucous membranes moist  Respiratory: Clear to auscultation bilaterally, respiratory effort normal on room air  Cardiovascular: RRR, no murmurs, rubs, or gallops, palpable pedal pulses bilaterally  Gastrointestinal: Positive bowel sounds, soft, nontender, nondistended  Musculoskeletal: No bilateral ankle  edema  Psychiatric: Appropriate affect, cooperative  Neurologic: Oriented x 2, strength symmetric in all extremities, Cranial Nerves grossly intact to confrontation, speech clear  Skin: No rashes      Pertinent  and/or Most Recent Results     Results from last 7 days   Lab Units  11/08/18   0338  11/07/18   1232   WBC 10*3/mm3  6.14  9.07   HEMOGLOBIN g/dL  10.6*  11.9   HEMATOCRIT %  33.1*  36.8   PLATELETS 10*3/mm3  145*  154   SODIUM mmol/L  136  132   POTASSIUM mmol/L  3.8  4.0   CHLORIDE mmol/L  103  99   CO2 mmol/L  25.0  25.0   BUN mg/dL  24*  23   CREATININE mg/dL  0.78  0.84   GLUCOSE mg/dL  97  114*   CALCIUM mg/dL  7.6*  8.2*     Results from last 7 days   Lab Units  11/08/18   0339  11/07/18   1854  11/07/18   1232   BILIRUBIN mg/dL   --    --   0.6   ALK PHOS U/L   --    --   74   ALT (SGPT) U/L   --    --   23   AST (SGOT) U/L   --    --   34*   PROTIME Seconds  11.4  11.4  11.3   INR   1.09  1.09  1.08   APTT seconds  40.5*  110.4*  93.5*  29.9     Results from last 7 days   Lab Units  11/08/18   0338   CHOLESTEROL mg/dL  83   TRIGLYCERIDES mg/dL  47   HDL CHOL mg/dL  36*     Results from last 7 days   Lab Units  11/08/18   0338  11/07/18   1232   HEMOGLOBIN A1C %  5.80*   --    BNP pg/mL   --   807.0*   TROPONIN I ng/mL   --   0.203*     Brief Urine Lab Results  (Last result in the past 365 days)      Color   Clarity   Blood   Leuk Est   Nitrite   Protein   CREAT   Urine HCG        11/11/18 0901 Dark Yellow Turbid Moderate (2+) Small (1+) Negative 30 mg/dL (1+)               Microbiology Results Abnormal     Procedure Component Value - Date/Time    Blood Culture - Blood, [[id number]] Collected:  11/07/18 1946    Lab Status:  Final result Specimen:  Blood from Hand, Left Updated:  11/12/18 2000     Blood Culture No growth at 5 days    Blood Culture - Blood, [[id number]] Collected:  11/07/18 1946    Lab Status:  Final result Specimen:  Blood from Arm, Right Updated:  11/12/18 2000     Blood Culture No  growth at 5 days    Blood Culture - Blood, [[id number]] Collected:  11/07/18 1225    Lab Status:  Final result Specimen:  Blood from Wrist, Right Updated:  11/12/18 1300     Blood Culture No growth at 5 days    Blood Culture - Blood, [[id number]] Collected:  11/07/18 1210    Lab Status:  Final result Specimen:  Blood from Arm, Right Updated:  11/12/18 1300     Blood Culture No growth at 5 days    Respiratory Culture - Sputum, Cough [[id number]] Collected:  11/08/18 1007    Lab Status:  Final result Specimen:  Sputum from Cough Updated:  11/10/18 0654     Respiratory Culture Moderate growth (3+) Normal Respiratory Roseann     Gram Stain Many (4+) WBCs per low power field      Moderate (3+) Epithelial cells per low power field      Moderate (3+) Gram positive cocci in pairs      Few (2+) Gram positive bacilli      Occasional Gram negative bacilli    Influenza A & B, RT PCR - Swab, Nasopharynx [[id number]]  (Normal) Collected:  11/07/18 1326    Lab Status:  Final result Specimen:  Swab from Nasopharynx Updated:  11/07/18 1436     Influenza A PCR Not Detected     Influenza B PCR Not Detected    Influenza A & B, RT PCR - Swab, Nasopharynx [[id number]] Collected:  11/07/18 1239    Lab Status:  Edited Result - FINAL Specimen:  Swab from Nasopharynx Updated:  11/07/18 1304     Influenza A PCR --     Comment: Wrong swab submitted. Recollect requested.   Corrected result. Previous result was Not Detected on 11/7/2018 at 1259 EST.        Influenza B PCR --     Comment: Wrong swab submitted. Recollect requested.   Corrected result. Previous result was Not Detected on 11/7/2018 at 1259 EST.             Imaging Results (all)     Procedure Component Value Units Date/Time    FL Video Swallow With Speech [[id number]] Collected:  11/12/18 1414     Updated:  11/12/18 1508    Narrative:       EXAMINATION: FL VIDEO SWALLOW W SPEECH-     INDICATION: dysphagia; J18.1-Lobar pneumonia, unspecified organism;  R09.02-Hypoxemia; J96.01-Acute  respiratory failure with hypoxia;  R13.12-Dysphagia, oropharyngeal phase; Z74.09-Other reduced mobility;  Z74.09-Other reduced mobility     TECHNIQUE: 42 seconds of fluoroscopic time was used for this exam. 1  associated image was saved. The patient was evaluated in the seated  lateral position while taking a variety of consistencies of barium by  mouth under the direction of speech pathology.     COMPARISON: NONE     FINDINGS: There was aspiration with sips of thin barium from a straw.  There was no penetration and no aspiration with single sips of thin  barium from a cup, pudding, or solid consistency barium.          Impression:       Fluoroscopy provided for a modified barium swallow. Please  see speech therapy report for full details and recommendations.         This report was finalized on 11/12/2018 3:06 PM by Darrel Ribeiro.       FL Video Swallow With Speech [[id number]] Collected:  11/08/18 1416     Updated:  11/08/18 1604    Narrative:       EXAMINATION: FL VIDEO SWALLOW W SPEECH-     INDICATION: DYSPHAGIA, OROPHARYNGEAL, HAS ATTRIBUTABLE CAUSE     TECHNIQUE: 2 minutes of fluoroscopic time was used for this exam. 1  associated image was saved. The patient was evaluated in the seated  lateral position while taking a variety of consistencies of barium by  mouth under the direction of speech pathology.     COMPARISON: NONE     FINDINGS: There was aspiration during sips of thin, nectar consistency  barium. There was a moderate amount of residue in the vallecula, and  performed sinuses after swallows with all media attempted. Residue  resulted in eventual aspiration.          Impression:       Fluoroscopy provided for a modified barium swallow. Please  see speech therapy report for full details and recommendations.         This report was finalized on 11/8/2018 4:01 PM by Dr. Brittney Alonso MD.       XR Chest 1 View [[id number]] Collected:  11/07/18 2145     Updated:  11/07/18 5561    Narrative:       EXAM:     XR Chest, 1 View     EXAM DATE/TIME:    11/7/2018 9:45 PM     CLINICAL HISTORY:    87 years old, female; Lobar pneumonia, unspecified organism; Hypoxemia;   Shortness of breath.     TECHNIQUE:    XR of the chest, 1 view.     COMPARISON:    CR XR CHEST 1 VW 11/7/2018 1:06 PM     FINDINGS:     Stable cardiomegaly with aortic atherosclerosis. Stable right basilar   airspace consolidation. Possible trace left pleural effusion. No pneumothorax.     No acute osseous abnormality.       Impression:       Stable right basilar airspace consolidation. Possible trace left pleural   effusion.     THIS DOCUMENT HAS BEEN ELECTRONICALLY SIGNED BY DEANA PORTER MD    XR Chest 1 View [[id number]] Collected:  11/07/18 1408     Updated:  11/07/18 1431    Narrative:       EXAMINATION: XR CHEST 1 VW- 11/07/2018     INDICATION: cough, hypoxemia, L lung wheezes     TECHNIQUE:  Single view frontal chest.     COMPARISONS: 08/17/2010     FINDINGS:  Calcified granulomata are seen in the left lung. There is  opacity in the periphery of the right lower lobe. No sizable effusion.  No pneumothorax. Atherosclerosis and tortuosity of the aorta. Prominent  cardiopericardial silhouette.       Impression:       Right lower lobe airspace disease which could represent  pneumonia.     D:  11/07/2018  E:  11/07/2018     This report was finalized on 11/7/2018 2:28 PM by Darrel Ribeiro.                       Results for orders placed during the hospital encounter of 11/07/18   Adult Transthoracic Echo Complete W/ Cont if Necessary Per Protocol    Narrative · Mild to moderate aortic valve regurgitation is present.  · Mild-to-moderate mitral valve regurgitation is present.  · Mild tricuspid valve regurgitation is present.  · Left ventricular wall thickness is consistent with mild concentric   hypertrophy.  · Left ventricular systolic function is normal. Estimated EF = 70%.  · There is no evidence of pericardial effusion.  · No evidence of pulmonary hypertension is  present.  · The aortic valve exhibits sclerosis.  · Moderate MAC is present.  · Normal right ventricular cavity size, wall thickness, systolic function   and septal motion noted.            Discharge Details        Discharge Medications      New Medications      Instructions Start Date   amoxicillin-clavulanate 875-125 MG per tablet  Commonly known as:  AUGMENTIN   1 tablet, Oral, Every 12 Hours Scheduled      melatonin 5 MG sublingual tablet sublingual tablet   2.5 mg, Sublingual, Nightly         Continue These Medications      Instructions Start Date   alendronate 70 MG tablet  Commonly known as:  FOSAMAX   70 mg, Oral, Every 7 Days, Tuesday      aspirin 81 MG EC tablet   81 mg, Oral, Daily      atorvastatin 10 MG tablet  Commonly known as:  LIPITOR   10 mg, Oral, Nightly      cholecalciferol 1000 units tablet  Commonly known as:  VITAMIN D3   1,000 Units, Oral, Daily      diazePAM 2 MG tablet  Commonly known as:  VALIUM   2 mg, Oral, 3 Times Daily PRN      donepezil 10 MG tablet  Commonly known as:  ARICEPT   10 mg, Oral, Nightly      levothyroxine 88 MCG tablet  Commonly known as:  SYNTHROID, LEVOTHROID   88 mcg, Oral, See Admin Instructions, Take daily EXCEPT on Sunday      PRESERVISION AREDS 2 PO   1 capsule, Oral, 2 Times Daily               Discharge Disposition:  Skilled Nursing Facility (DC - External)    Discharge Diet:  Diet Instructions     Diet: Soft Texture, Cardiac; Thin Liquids, No Restrictions; Whole      Discharge Diet:   Soft Texture  Cardiac       Fluid Consistency:  Thin Liquids, No Restrictions    Soft Options:  Whole        MBS/VFSS/FEES    Reason for Referral  Patient was referred for a MBS to assess the efficiency of his/her swallow function, rule out aspiration and make recommendations regarding safe dietary consistencies, effective compensatory strategies, and safe eating environment.                   Recommendations/Treatment  Oral Prep Phase: impaired oral phase of swallowing  Oral  Transit Phase: impaired  Oral Residue: impaired              SLP Swallowing Diagnosis: mod-severe, oral dysfunction, pharyngeal dysfunction  Functional Impact: risk of aspiration/pneumonia, risk of malnutrition, risk of dehydration  Rehab Potential/Prognosis, Swallowing: adequate, monitor progress closely  Swallow Criteria for Skilled Therapeutic Interventions Met: demonstrates skilled criteria    Therapy Frequency (Swallow): 5 days per week  Predicted Duration Therapy Intervention (Days): until discharge  SLP Diet Recommendation: puree, honey thick liquids  Recommended Diagnostics: reassess via VFSS (MBS), other (see comments) (when indicated by SLP)  Recommended Precautions and Strategies: upright posture during/after eating, small bites of food and sips of liquid, no straw, multiple swallows per bite of food, multiple swallows per sip of liquid, other (see comments) (general aspiration precautions; oral care BID/PRN)  SLP Rec. for Method of Medication Administration: meds crushed, with pudding or applesauce  Monitor for Signs of Aspiration: yes, notify SLP if any concerns  Anticipated Dischage Disposition: unknown, anticipate therapy at next level of care      Oral Preparation/ Oral Phase  Oral Prep Phase: impaired oral phase of swallowing  Oral Holding: all consistencies tested, secondary to impaired cognitive status  Prolonged Manipulation: all consistencies tested, secondary to reduced lingual strength, secondary to reduced lingual range of motion, secondary to impaired cognitive status  Oral Preparatory Phase, Comment: Prolonged manipulation ? w/ solids. Oral holding t/o eval.          Pharyngeal Phase  Initiation of Pharyngeal Swallow: bolus in pyriform sinuses  Pharyngeal Phase: impaired pharyngeal phase of swallowing  Penetration Before the Swallow: thin liquids, secondary to reduced back of tongue control, secondary to delayed swallow initiation or mistiming  Penetration During the Swallow: thin liquids,  nectar-thick liquids, secondary to delayed swallow initiation or mistiming, secondary to reduced laryngeal elevation, secondary to reduced vestibular closure, other (see comments) (penetration didn't clear and deepened to TVFs)  Aspiration After the Swallow: thin liquids, nectar-thick liquids, secondary to residue, in laryngeal vestibule  Response to Penetration: no response, deep  Response to Aspiration: no response, silent aspiration, could not clear subglottic material  Pharyngeal Residue: all consistencies tested, base of tongue, valleculae, pyriform sinuses, posterior pharyngeal wall, laryngeal vestibule, diffuse within pharynx, secondary to reduced base of tongue retraction, secondary to reduced posterior pharyngeal wall stripping, secondary to reduced laryngeal elevation, other (see comments) (> in valleculae and incr'd w/ solids)  Response to Residue: unable to clear residue, other (see comments) (residue reduced w/ subsequent swallows)  Attempted Compensatory Maneuvers: bolus size, bolus presentation style, alternative liquids/solids, throat clear after swallow, other (see comments) (pt w/ difficulty following commands)  Response to Attempted Compensatory Maneuvers: prevented aspiration, did not reduce residue  Pharyngeal Phase, Comment: Moderate-severe oropharyngeal dysphagia. Penetration occurred before/during the swallow w/ thins and during the swallow w/ nectar-thick liquids 2' mistiming/delay and reduced vestibular closure. Penetration did not clear and deepened w/ continued trials. Pt eventually aspirated thin and nectar-thick vestibular residue t/o exam. Aspiration was silent. No penetration/aspiration observed w/ honey-thick liquids or pudding. Moderate diffuse pharyngeal residue present w/ all consistencies (> in valleculae and incr'd w/ solids) 2' reduced base of tongue retraction, decr'd pharyngeal stripping wave, and reduced elevation. Residue reduced w/ subsequent swallows, but unable to fully  clear.              Cervical Esophageal Phase              Prognosis          MBS/VFSS/FEES 11/12/18 **most recent swallowing recommendations below:    Reason for Referral  Patient was referred for a MBS to assess the efficiency of his/her swallow function, rule out aspiration and make recommendations regarding safe dietary consistencies, effective compensatory strategies, and safe eating environment.        Referring Physician: MD Shelly          Recommendations/Treatment  Oral Prep Phase: impaired oral phase of swallowing  Oral Transit Phase: impaired  Oral Residue: impaired  VFSS Summary: SLP MBS evaluation completed. Pt presents w/ mild-moderate oropharyngeal dysphagia. Prolonged manipulation/mastication and increased A-P transit time w/ regular solid. Mild diffuse oral residue w/ all consistencies tested that typically cleared w/ spontaneous subsequent swallows. Prespill + delay to the level of the pyriform sinuses resulted in aspiration before the swallow w/ thin liquid via straw. Aspiration was eliminated w/ small, single sips of thin liquid via cup. No penetration/aspiration w/ pudding or solid. Mild vallecular residue w/ pudding and solid 2' reduced base of tongue retraction. Recommend soft, whole diet w/ thin liquids via small, single cup sips. Meds whole or crushed in puree. Follow standard aspiration precautions.    Severity Level of Dysphagia: mild-moderate dysphagia  Consistencies Aspirated/Penetrated: aspirated, thin liquids, other (see comments)(only via straw)         SLP Swallowing Diagnosis: mild-moderate, oral dysfunction, pharyngeal dysfunction  Functional Impact: risk of aspiration/pneumonia  Rehab Potential/Prognosis, Swallowing: adequate, monitor progress closely  Swallow Criteria for Skilled Therapeutic Interventions Met: demonstrates skilled criteria    Therapy Frequency (Swallow): 3 days per week  Predicted Duration Therapy Intervention (Days): until discharge  SLP Diet Recommendation:  soft textures, whole, thin liquids, other (see comments)(via small single cup sips. NO straws)  Recommended Precautions and Strategies: upright posture during/after eating, small bites of food and sips of liquid, no straw  SLP Rec. for Method of Medication Administration: meds whole, meds crushed, with pudding or applesauce  Monitor for Signs of Aspiration: yes, notify SLP if any concerns  Anticipated Dischage Disposition: unknown, anticipate therapy at next level of care      Oral Preparation/ Oral Phase  Oral Prep Phase: impaired oral phase of swallowing  Prolonged Manipulation: regular textures         Pharyngeal Phase  Initiation of Pharyngeal Swallow: bolus in pyriform sinuses  Pharyngeal Phase: impaired pharyngeal phase of swallowing  Aspiration Before the Swallow: thin liquids, secondary to reduced back of tongue control, secondary to delayed swallow initiation or mistiming, other (see comments)(only via straw)  Response to Aspiration: throat clear  Pharyngeal Residue: pudding/puree, regular textures, base of tongue, valleculae, secondary to reduced base of tongue retraction  Response to Residue: unable to clear residue  Attempted Compensatory Maneuvers: bolus size, bolus presentation style  Response to Attempted Compensatory Maneuvers: prevented aspiration             Cervical Esophageal Phase              Prognosis                              Discharge Activity:   Activity Instructions     Activity as Tolerated            Code Status/Level of Support:  Code Status and Medical Interventions:   Ordered at: 11/07/18 9145     Limited Support to NOT Include:    Intubation     Code Status:    CPR     Medical Interventions (Level of Support Prior to Arrest):    Limited     Comments:    Discussed with patient's son who wanted full code currently but plans to review her living will and bring it to the hospital       No future appointments.    Additional Instructions for the Follow-ups that You Need to Schedule      Discharge Follow-up with PCP   As directed       Currently Documented PCP:    Samia Carrington MD    PCP Phone Number:    [phone]     Follow Up Details:  first available hospital follow up appt.               Time Spent on Discharge:  45 minutes    Electronically signed by BURTON Weinberg, 11/13/18, 11:01 AM.

## 2018-12-12 ENCOUNTER — HOSPITAL ENCOUNTER (OUTPATIENT)
Dept: GENERAL RADIOLOGY | Facility: HOSPITAL | Age: 83
Discharge: HOME | End: 2018-12-12
Admitting: INTERNAL MEDICINE

## 2018-12-12 ENCOUNTER — TRANSCRIBE ORDERS (OUTPATIENT)
Dept: ADMINISTRATIVE | Facility: HOSPITAL | Age: 83
End: 2018-12-12

## 2018-12-12 DIAGNOSIS — J18.9 COMMUNITY ACQUIRED PNEUMONIA, UNSPECIFIED LATERALITY: Primary | ICD-10-CM

## 2018-12-12 PROCEDURE — 71046 X-RAY EXAM CHEST 2 VIEWS: CPT
